# Patient Record
Sex: FEMALE | Race: WHITE | NOT HISPANIC OR LATINO | Employment: FULL TIME | URBAN - METROPOLITAN AREA
[De-identification: names, ages, dates, MRNs, and addresses within clinical notes are randomized per-mention and may not be internally consistent; named-entity substitution may affect disease eponyms.]

---

## 2020-11-19 ENCOUNTER — OFFICE VISIT (OUTPATIENT)
Dept: URGENT CARE | Age: 40
End: 2020-11-19
Payer: COMMERCIAL

## 2020-11-19 VITALS
WEIGHT: 188 LBS | BODY MASS INDEX: 33.31 KG/M2 | HEART RATE: 80 BPM | OXYGEN SATURATION: 100 % | RESPIRATION RATE: 18 BRPM | HEIGHT: 63 IN | TEMPERATURE: 98 F

## 2020-11-19 DIAGNOSIS — Z11.59 SPECIAL SCREENING EXAMINATION FOR UNSPECIFIED VIRAL DISEASE: Primary | ICD-10-CM

## 2020-11-19 PROCEDURE — U0003 INFECTIOUS AGENT DETECTION BY NUCLEIC ACID (DNA OR RNA); SEVERE ACUTE RESPIRATORY SYNDROME CORONAVIRUS 2 (SARS-COV-2) (CORONAVIRUS DISEASE [COVID-19]), AMPLIFIED PROBE TECHNIQUE, MAKING USE OF HIGH THROUGHPUT TECHNOLOGIES AS DESCRIBED BY CMS-2020-01-R: HCPCS | Performed by: NURSE PRACTITIONER

## 2020-11-19 PROCEDURE — 99213 OFFICE O/P EST LOW 20 MIN: CPT | Performed by: NURSE PRACTITIONER

## 2020-11-20 LAB — SARS-COV-2 RNA SPEC QL NAA+PROBE: DETECTED

## 2020-11-22 ENCOUNTER — TELEPHONE (OUTPATIENT)
Dept: DERMATOLOGY | Facility: CLINIC | Age: 40
End: 2020-11-22

## 2020-11-23 ENCOUNTER — TELEPHONE (OUTPATIENT)
Dept: URGENT CARE | Age: 40
End: 2020-11-23

## 2020-12-03 DIAGNOSIS — U07.1 COVID-19 VIRUS INFECTION: ICD-10-CM

## 2020-12-03 PROCEDURE — U0003 INFECTIOUS AGENT DETECTION BY NUCLEIC ACID (DNA OR RNA); SEVERE ACUTE RESPIRATORY SYNDROME CORONAVIRUS 2 (SARS-COV-2) (CORONAVIRUS DISEASE [COVID-19]), AMPLIFIED PROBE TECHNIQUE, MAKING USE OF HIGH THROUGHPUT TECHNOLOGIES AS DESCRIBED BY CMS-2020-01-R: HCPCS | Performed by: FAMILY MEDICINE

## 2020-12-04 LAB — SARS-COV-2 RNA SPEC QL NAA+PROBE: NOT DETECTED

## 2021-06-09 ENCOUNTER — LAB (OUTPATIENT)
Dept: LAB | Age: 41
End: 2021-06-09
Payer: COMMERCIAL

## 2021-06-09 DIAGNOSIS — N92.6 IRREGULAR MENSES: ICD-10-CM

## 2021-06-09 DIAGNOSIS — Z00.00 ROUTINE ADULT HEALTH MAINTENANCE: ICD-10-CM

## 2021-06-09 LAB
25(OH)D3 SERPL-MCNC: 12.9 NG/ML (ref 30–100)
ALBUMIN SERPL BCP-MCNC: 3.4 G/DL (ref 3.5–5)
ALP SERPL-CCNC: 55 U/L (ref 46–116)
ALT SERPL W P-5'-P-CCNC: 20 U/L (ref 12–78)
ANION GAP SERPL CALCULATED.3IONS-SCNC: 5 MMOL/L (ref 4–13)
AST SERPL W P-5'-P-CCNC: 11 U/L (ref 5–45)
BASOPHILS # BLD AUTO: 0.08 THOUSANDS/ΜL (ref 0–0.1)
BASOPHILS NFR BLD AUTO: 1 % (ref 0–1)
BILIRUB SERPL-MCNC: 0.33 MG/DL (ref 0.2–1)
BUN SERPL-MCNC: 16 MG/DL (ref 5–25)
CALCIUM ALBUM COR SERPL-MCNC: 9.3 MG/DL (ref 8.3–10.1)
CALCIUM SERPL-MCNC: 8.8 MG/DL (ref 8.3–10.1)
CHLORIDE SERPL-SCNC: 107 MMOL/L (ref 100–108)
CHOLEST SERPL-MCNC: 402 MG/DL (ref 50–200)
CO2 SERPL-SCNC: 26 MMOL/L (ref 21–32)
CREAT SERPL-MCNC: 0.61 MG/DL (ref 0.6–1.3)
EOSINOPHIL # BLD AUTO: 0.15 THOUSAND/ΜL (ref 0–0.61)
EOSINOPHIL NFR BLD AUTO: 1 % (ref 0–6)
ERYTHROCYTE [DISTWIDTH] IN BLOOD BY AUTOMATED COUNT: 13.3 % (ref 11.6–15.1)
FSH SERPL-ACNC: 7.7 MIU/ML
GFR SERPL CREATININE-BSD FRML MDRD: 111 ML/MIN/1.73SQ M
GLUCOSE P FAST SERPL-MCNC: 98 MG/DL (ref 65–99)
HCT VFR BLD AUTO: 43.3 % (ref 34.8–46.1)
HDLC SERPL-MCNC: 51 MG/DL
HGB BLD-MCNC: 14.3 G/DL (ref 11.5–15.4)
IMM GRANULOCYTES # BLD AUTO: 0.03 THOUSAND/UL (ref 0–0.2)
IMM GRANULOCYTES NFR BLD AUTO: 0 % (ref 0–2)
LDLC SERPL CALC-MCNC: 333 MG/DL (ref 0–100)
LH SERPL-ACNC: 27.7 MIU/ML
LYMPHOCYTES # BLD AUTO: 3.55 THOUSANDS/ΜL (ref 0.6–4.47)
LYMPHOCYTES NFR BLD AUTO: 32 % (ref 14–44)
MCH RBC QN AUTO: 30.3 PG (ref 26.8–34.3)
MCHC RBC AUTO-ENTMCNC: 33 G/DL (ref 31.4–37.4)
MCV RBC AUTO: 92 FL (ref 82–98)
MONOCYTES # BLD AUTO: 0.54 THOUSAND/ΜL (ref 0.17–1.22)
MONOCYTES NFR BLD AUTO: 5 % (ref 4–12)
NEUTROPHILS # BLD AUTO: 6.6 THOUSANDS/ΜL (ref 1.85–7.62)
NEUTS SEG NFR BLD AUTO: 61 % (ref 43–75)
NONHDLC SERPL-MCNC: 351 MG/DL
NRBC BLD AUTO-RTO: 0 /100 WBCS
PLATELET # BLD AUTO: 250 THOUSANDS/UL (ref 149–390)
PMV BLD AUTO: 11.4 FL (ref 8.9–12.7)
POTASSIUM SERPL-SCNC: 4.2 MMOL/L (ref 3.5–5.3)
PROLACTIN SERPL-MCNC: 9.6 NG/ML
PROT SERPL-MCNC: 7 G/DL (ref 6.4–8.2)
RBC # BLD AUTO: 4.72 MILLION/UL (ref 3.81–5.12)
SODIUM SERPL-SCNC: 138 MMOL/L (ref 136–145)
TRIGL SERPL-MCNC: 89 MG/DL
TSH SERPL DL<=0.05 MIU/L-ACNC: 1.22 UIU/ML (ref 0.36–3.74)
WBC # BLD AUTO: 10.95 THOUSAND/UL (ref 4.31–10.16)

## 2021-06-09 PROCEDURE — 84403 ASSAY OF TOTAL TESTOSTERONE: CPT

## 2021-06-09 PROCEDURE — 80061 LIPID PANEL: CPT

## 2021-06-09 PROCEDURE — 84402 ASSAY OF FREE TESTOSTERONE: CPT

## 2021-06-09 PROCEDURE — 80053 COMPREHEN METABOLIC PANEL: CPT

## 2021-06-09 PROCEDURE — 36415 COLL VENOUS BLD VENIPUNCTURE: CPT

## 2021-06-09 PROCEDURE — 84146 ASSAY OF PROLACTIN: CPT

## 2021-06-09 PROCEDURE — 85025 COMPLETE CBC W/AUTO DIFF WBC: CPT

## 2021-06-09 PROCEDURE — 84443 ASSAY THYROID STIM HORMONE: CPT

## 2021-06-09 PROCEDURE — 83001 ASSAY OF GONADOTROPIN (FSH): CPT

## 2021-06-09 PROCEDURE — 83002 ASSAY OF GONADOTROPIN (LH): CPT

## 2021-06-09 PROCEDURE — 82306 VITAMIN D 25 HYDROXY: CPT

## 2021-06-10 LAB
TESTOST FREE SERPL-MCNC: 3.9 PG/ML (ref 0–4.2)
TESTOST SERPL-MCNC: 47 NG/DL (ref 4–50)

## 2021-06-14 NOTE — RESULT ENCOUNTER NOTE
Please call the patient regarding her abnormal result    Cholesterol extremely high she must take medicine  vit d low, vit d 68853 unit once a week #12 and 2 ref

## 2021-11-01 ENCOUNTER — LAB (OUTPATIENT)
Dept: LAB | Age: 41
End: 2021-11-01
Payer: COMMERCIAL

## 2021-11-01 DIAGNOSIS — R42 DIZZINESS: ICD-10-CM

## 2021-11-01 DIAGNOSIS — M25.59 PAIN IN OTHER JOINT: ICD-10-CM

## 2021-11-01 DIAGNOSIS — D72.829 LEUKOCYTOSIS, UNSPECIFIED TYPE: ICD-10-CM

## 2021-11-01 DIAGNOSIS — E78.01 FAMILIAL HYPERCHOLESTEROLEMIA: ICD-10-CM

## 2021-11-01 LAB
ALBUMIN SERPL BCP-MCNC: 3.8 G/DL (ref 3.5–5)
ALP SERPL-CCNC: 63 U/L (ref 46–116)
ALT SERPL W P-5'-P-CCNC: 21 U/L (ref 12–78)
ANION GAP SERPL CALCULATED.3IONS-SCNC: 3 MMOL/L (ref 4–13)
AST SERPL W P-5'-P-CCNC: 13 U/L (ref 5–45)
BASOPHILS # BLD AUTO: 0.09 THOUSANDS/ΜL (ref 0–0.1)
BASOPHILS NFR BLD AUTO: 1 % (ref 0–1)
BILIRUB SERPL-MCNC: 0.36 MG/DL (ref 0.2–1)
BUN SERPL-MCNC: 19 MG/DL (ref 5–25)
CALCIUM SERPL-MCNC: 9.2 MG/DL (ref 8.3–10.1)
CHLORIDE SERPL-SCNC: 108 MMOL/L (ref 100–108)
CHOLEST SERPL-MCNC: 334 MG/DL (ref 50–200)
CK SERPL-CCNC: 85 U/L (ref 26–192)
CO2 SERPL-SCNC: 24 MMOL/L (ref 21–32)
CREAT SERPL-MCNC: 0.62 MG/DL (ref 0.6–1.3)
CRP SERPL QL: 6.9 MG/L
EOSINOPHIL # BLD AUTO: 0.21 THOUSAND/ΜL (ref 0–0.61)
EOSINOPHIL NFR BLD AUTO: 2 % (ref 0–6)
ERYTHROCYTE [DISTWIDTH] IN BLOOD BY AUTOMATED COUNT: 13.7 % (ref 11.6–15.1)
ERYTHROCYTE [SEDIMENTATION RATE] IN BLOOD: 37 MM/HOUR (ref 0–19)
GFR SERPL CREATININE-BSD FRML MDRD: 111 ML/MIN/1.73SQ M
GLUCOSE P FAST SERPL-MCNC: 102 MG/DL (ref 65–99)
HCT VFR BLD AUTO: 45 % (ref 34.8–46.1)
HDLC SERPL-MCNC: 67 MG/DL
HGB BLD-MCNC: 14.7 G/DL (ref 11.5–15.4)
IMM GRANULOCYTES # BLD AUTO: 0.07 THOUSAND/UL (ref 0–0.2)
IMM GRANULOCYTES NFR BLD AUTO: 1 % (ref 0–2)
LDLC SERPL CALC-MCNC: 254 MG/DL (ref 0–100)
LYMPHOCYTES # BLD AUTO: 3.08 THOUSANDS/ΜL (ref 0.6–4.47)
LYMPHOCYTES NFR BLD AUTO: 25 % (ref 14–44)
MCH RBC QN AUTO: 29.7 PG (ref 26.8–34.3)
MCHC RBC AUTO-ENTMCNC: 32.7 G/DL (ref 31.4–37.4)
MCV RBC AUTO: 91 FL (ref 82–98)
MONOCYTES # BLD AUTO: 0.58 THOUSAND/ΜL (ref 0.17–1.22)
MONOCYTES NFR BLD AUTO: 5 % (ref 4–12)
NEUTROPHILS # BLD AUTO: 8.45 THOUSANDS/ΜL (ref 1.85–7.62)
NEUTS SEG NFR BLD AUTO: 66 % (ref 43–75)
NONHDLC SERPL-MCNC: 267 MG/DL
NRBC BLD AUTO-RTO: 0 /100 WBCS
PLATELET # BLD AUTO: 239 THOUSANDS/UL (ref 149–390)
PMV BLD AUTO: 11.6 FL (ref 8.9–12.7)
POTASSIUM SERPL-SCNC: 4.2 MMOL/L (ref 3.5–5.3)
PROT SERPL-MCNC: 7.7 G/DL (ref 6.4–8.2)
RBC # BLD AUTO: 4.95 MILLION/UL (ref 3.81–5.12)
SODIUM SERPL-SCNC: 135 MMOL/L (ref 136–145)
TRIGL SERPL-MCNC: 63 MG/DL
URATE SERPL-MCNC: 3.5 MG/DL (ref 2–6.8)
WBC # BLD AUTO: 12.48 THOUSAND/UL (ref 4.31–10.16)

## 2021-11-01 PROCEDURE — 86038 ANTINUCLEAR ANTIBODIES: CPT

## 2021-11-01 PROCEDURE — 88185 FLOWCYTOMETRY/TC ADD-ON: CPT

## 2021-11-01 PROCEDURE — 84550 ASSAY OF BLOOD/URIC ACID: CPT

## 2021-11-01 PROCEDURE — 80061 LIPID PANEL: CPT

## 2021-11-01 PROCEDURE — 86430 RHEUMATOID FACTOR TEST QUAL: CPT

## 2021-11-01 PROCEDURE — 88184 FLOWCYTOMETRY/ TC 1 MARKER: CPT

## 2021-11-01 PROCEDURE — 82550 ASSAY OF CK (CPK): CPT

## 2021-11-01 PROCEDURE — 84165 PROTEIN E-PHORESIS SERUM: CPT | Performed by: PATHOLOGY

## 2021-11-01 PROCEDURE — 86618 LYME DISEASE ANTIBODY: CPT

## 2021-11-01 PROCEDURE — 85025 COMPLETE CBC W/AUTO DIFF WBC: CPT

## 2021-11-01 PROCEDURE — 84165 PROTEIN E-PHORESIS SERUM: CPT

## 2021-11-01 PROCEDURE — 85652 RBC SED RATE AUTOMATED: CPT

## 2021-11-01 PROCEDURE — 86140 C-REACTIVE PROTEIN: CPT

## 2021-11-01 PROCEDURE — 80053 COMPREHEN METABOLIC PANEL: CPT

## 2021-11-01 PROCEDURE — 36415 COLL VENOUS BLD VENIPUNCTURE: CPT

## 2021-11-02 LAB
ALBUMIN SERPL ELPH-MCNC: 4.51 G/DL (ref 3.5–5)
ALBUMIN SERPL ELPH-MCNC: 62.6 % (ref 52–65)
ALPHA1 GLOB SERPL ELPH-MCNC: 0.28 G/DL (ref 0.1–0.4)
ALPHA1 GLOB SERPL ELPH-MCNC: 3.9 % (ref 2.5–5)
ALPHA2 GLOB SERPL ELPH-MCNC: 0.84 G/DL (ref 0.4–1.2)
ALPHA2 GLOB SERPL ELPH-MCNC: 11.6 % (ref 7–13)
B BURGDOR IGG+IGM SER-ACNC: 35
BETA GLOB ABNORMAL SERPL ELPH-MCNC: 0.36 G/DL (ref 0.4–0.8)
BETA1 GLOB SERPL ELPH-MCNC: 5 % (ref 5–13)
BETA2 GLOB SERPL ELPH-MCNC: 4.4 % (ref 2–8)
BETA2+GAMMA GLOB SERPL ELPH-MCNC: 0.32 G/DL (ref 0.2–0.5)
GAMMA GLOB ABNORMAL SERPL ELPH-MCNC: 0.9 G/DL (ref 0.5–1.6)
GAMMA GLOB SERPL ELPH-MCNC: 12.5 % (ref 12–22)
IGG/ALB SER: 1.67 {RATIO} (ref 1.1–1.8)
PROT PATTERN SERPL ELPH-IMP: ABNORMAL
PROT SERPL-MCNC: 7.2 G/DL (ref 6.4–8.2)
RHEUMATOID FACT SER QL LA: NEGATIVE
SCAN RESULT: NORMAL

## 2021-11-03 LAB — RYE IGE QN: NEGATIVE

## 2021-11-08 ENCOUNTER — HOSPITAL ENCOUNTER (OUTPATIENT)
Dept: MRI IMAGING | Facility: HOSPITAL | Age: 41
Discharge: HOME | End: 2021-11-08
Payer: COMMERCIAL

## 2021-11-08 DIAGNOSIS — H53.8 BLURRED VISION: ICD-10-CM

## 2021-11-08 DIAGNOSIS — R42 DIZZINESS: ICD-10-CM

## 2021-11-08 DIAGNOSIS — R51.9 DAILY HEADACHE: ICD-10-CM

## 2021-11-08 PROCEDURE — G1004 CDSM NDSC: HCPCS

## 2021-11-08 PROCEDURE — 70551 MRI BRAIN STEM W/O DYE: CPT

## 2022-01-01 ENCOUNTER — OFFICE VISIT (OUTPATIENT)
Dept: URGENT CARE | Age: 42
End: 2022-01-01
Payer: COMMERCIAL

## 2022-01-01 VITALS
BODY MASS INDEX: 33.66 KG/M2 | HEART RATE: 88 BPM | WEIGHT: 190 LBS | RESPIRATION RATE: 18 BRPM | OXYGEN SATURATION: 98 % | HEIGHT: 63 IN | TEMPERATURE: 97 F

## 2022-01-01 DIAGNOSIS — J06.9 VIRAL URI WITH COUGH: Primary | ICD-10-CM

## 2022-01-01 LAB — S PYO AG THROAT QL: NEGATIVE

## 2022-01-01 PROCEDURE — 99213 OFFICE O/P EST LOW 20 MIN: CPT | Performed by: FAMILY MEDICINE

## 2022-01-01 PROCEDURE — 87880 STREP A ASSAY W/OPTIC: CPT

## 2022-01-01 PROCEDURE — 87636 SARSCOV2 & INF A&B AMP PRB: CPT | Performed by: FAMILY MEDICINE

## 2022-01-01 PROCEDURE — 87070 CULTURE OTHR SPECIMN AEROBIC: CPT | Performed by: FAMILY MEDICINE

## 2022-01-01 RX ORDER — BENZONATATE 200 MG/1
200 CAPSULE ORAL 3 TIMES DAILY PRN
Qty: 15 CAPSULE | Refills: 0 | Status: SHIPPED | OUTPATIENT
Start: 2022-01-01

## 2022-01-01 NOTE — PROGRESS NOTES
[facility]        NAME: Nereyda Aponte is a 35 y o  female  : 1980    MRN: [id number]  DATE: 2022  TIME: 1:15 PM    Assessment and Plan   Viral URI with cough [J06 9]  1  Viral URI with cough  COVID/FLU- Office Collect    Throat culture    POCT rapid strepA    benzonatate (TESSALON) 200 MG capsule         Patient Instructions     Strep negative  Will send for culture  Tessalon sent to pharmacy today for cough  COVID & Flu swab collected today, test results pending  COVID & Flu test results are available between 24 and 48 hours  Your results will come through [address]  Check MyChart and call if needed for test results  Quarantine guidelines discussed  OTC supplements/medications discussed  Follow-up with PCP in the next 1-2 days for re-evaluation if symptoms continue or worsen  Go to the ED if any fevers, unable to stay hydrated, abdominal pain, chest pain, shortness of breath, wheezing, chest tightness, cough or cold symptoms, new or worsening symptoms or other concerning symptoms  Chief Complaint     Chief Complaint   Patient presents with    Cough     fever, cough, sore throat, body aches x 3 days         History of Present Illness     Nereyda Aponte is a 35 y o  female presenting to the office today for upper respiratory complaints  Symptoms have been present for 2 days, and include cough, congestion and fever  She has tried OTC decongestants for her symptoms, with mild relief  Sick contacts include: none  Recent travel: none    Review of Systems     Review of Systems   Constitutional: Positive for chills, fatigue and fever  HENT: Positive for congestion, postnasal drip and sore throat  Negative for ear discharge, ear pain, sinus pressure and sinus pain  Eyes: Negative for pain and discharge  Respiratory: Positive for cough  Negative for shortness of breath  Cardiovascular: Negative for chest pain and palpitations     Gastrointestinal: Negative for abdominal pain, diarrhea, nausea and vomiting  Genitourinary: Negative for difficulty urinating and dysuria  Musculoskeletal: Negative for arthralgias and myalgias  Skin: Negative for rash  Neurological: Negative for dizziness, syncope, light-headedness, numbness and headaches  Psychiatric/Behavioral: Negative for agitation  All other systems reviewed and are negative  Current Medications       Current Outpatient Medications:     ergocalciferol (VITAMIN D2) 50,000 units, Take 1 capsule (50,000 Units total) by mouth once a week, Disp: 12 capsule, Rfl: 1    ezetimibe (ZETIA) 10 mg tablet, Take 1 tablet (10 mg total) by mouth daily, Disp: 90 tablet, Rfl: 3    levocetirizine (XYZAL) 5 MG tablet, Take 1 tablet (5 mg total) by mouth every evening, Disp: 90 tablet, Rfl: 1    phentermine (ADIPEX-P) 37 5 MG tablet, Take 1 tablet (37 5 mg total) by mouth daily, Disp: 90 tablet, Rfl: 0    Pitavastatin Calcium (Livalo) 4 MG TABS, Take 1 tablet (4 mg total) by mouth daily at bedtime, Disp: 90 tablet, Rfl: 3    benzonatate (TESSALON) 200 MG capsule, Take 1 capsule (200 mg total) by mouth 3 (three) times a day as needed for cough, Disp: 15 capsule, Rfl: 0    Current Allergies     Allergies as of 01/01/2022    (No Known Allergies)            The following portions of the patient's history were reviewed and updated as appropriate: allergies, current medications, past family history, past medical history, past social history, past surgical history and problem list      History reviewed  No pertinent past medical history  History reviewed  No pertinent surgical history  History reviewed  No pertinent family history  Medications have been verified  Objective     Pulse 88   Temp (!) 97 °F (36 1 °C)   Resp 18   Ht 5' 3" (1 6 m)   Wt 86 2 kg (190 lb)   SpO2 98%   BMI 33 66 kg/m²   No LMP recorded  Physical Exam     Physical Exam  Vitals reviewed     Constitutional:       General: She is not in acute distress  Appearance: Normal appearance  She is not ill-appearing  HENT:      Head: Normocephalic and atraumatic  Right Ear: Tympanic membrane and ear canal normal  No middle ear effusion  Left Ear: Tympanic membrane and ear canal normal   No middle ear effusion  Mouth/Throat:      Mouth: Mucous membranes are moist       Pharynx: Posterior oropharyngeal erythema present  No oropharyngeal exudate  Tonsils: No tonsillar exudate  Eyes:      Extraocular Movements: Extraocular movements intact  Conjunctiva/sclera: Conjunctivae normal       Pupils: Pupils are equal, round, and reactive to light  Cardiovascular:      Rate and Rhythm: Normal rate and regular rhythm  Pulses: Normal pulses  Heart sounds: Normal heart sounds  No murmur heard  Pulmonary:      Effort: Pulmonary effort is normal  No respiratory distress  Breath sounds: Normal breath sounds  No wheezing  Abdominal:      General: Bowel sounds are normal  There is no distension  Palpations: Abdomen is soft  Tenderness: There is no abdominal tenderness  There is no guarding  Musculoskeletal:      Cervical back: Normal range of motion and neck supple  No tenderness  Lymphadenopathy:      Cervical: Cervical adenopathy present  Skin:     General: Skin is warm  Neurological:      General: No focal deficit present  Mental Status: She is alert     Psychiatric:         Mood and Affect: Mood normal          Behavior: Behavior normal          Judgment: Judgment normal

## 2022-01-03 LAB — BACTERIA THROAT CULT: NORMAL

## 2022-01-06 LAB
FLUAV RNA RESP QL NAA+PROBE: NEGATIVE
FLUBV RNA RESP QL NAA+PROBE: NEGATIVE
SARS-COV-2 RNA RESP QL NAA+PROBE: POSITIVE

## 2022-07-02 ENCOUNTER — OFFICE VISIT (OUTPATIENT)
Dept: URGENT CARE | Age: 42
End: 2022-07-02
Payer: COMMERCIAL

## 2022-07-02 VITALS
TEMPERATURE: 98.7 F | HEART RATE: 75 BPM | BODY MASS INDEX: 35.44 KG/M2 | WEIGHT: 200 LBS | RESPIRATION RATE: 18 BRPM | OXYGEN SATURATION: 98 % | HEIGHT: 63 IN

## 2022-07-02 DIAGNOSIS — L23.7 POISON IVY: Primary | ICD-10-CM

## 2022-07-02 PROCEDURE — 99213 OFFICE O/P EST LOW 20 MIN: CPT

## 2022-07-02 RX ORDER — TRIAMCINOLONE ACETONIDE 1 MG/G
CREAM TOPICAL 2 TIMES DAILY
Qty: 30 G | Refills: 0 | Status: SHIPPED | OUTPATIENT
Start: 2022-07-02

## 2022-07-02 RX ORDER — PREDNISONE 20 MG/1
40 TABLET ORAL DAILY
Qty: 8 TABLET | Refills: 0 | Status: SHIPPED | OUTPATIENT
Start: 2022-07-02 | End: 2022-07-06

## 2022-07-02 NOTE — PROGRESS NOTES
[facility]        NAME: Marj Bautista is a 38 y o  female  : 1980    MRN: [id number]  DATE: 2022  TIME: 1:11 PM    Assessment and Plan   Poison ivy [L23 7]  1  Poison ivy  predniSONE 20 mg tablet    triamcinolone (KENALOG) 0 1 % cream     Patient presents with complaints of itchy rash with blisters  Started about a week ago after gardening  Rash started on legs and has spread to arms, stomach and hands  Assessment notes urticarial, vesicular rash to legs, hands, stomach  Will order prednisone and kenalog  Instructions provided regarding poison ivy  Patient Instructions       Follow up with PCP  As needed  Chief Complaint     Chief Complaint   Patient presents with    Rash     Rash x 1 week         History of Present Illness       Patient presents with complaints of itchy rash with blisters  Started about a week ago after gardening  Rash started on legs and has spread to arms, stomach and hands  Rash  Pertinent negatives include no congestion, cough, diarrhea, fever, shortness of breath, sore throat or vomiting  Review of Systems   Review of Systems   Constitutional: Negative for chills and fever  HENT: Negative for congestion, ear pain, postnasal drip, sinus pain and sore throat  Eyes: Negative for pain and itching  Respiratory: Negative for cough, shortness of breath and wheezing  Cardiovascular: Negative for chest pain and palpitations  Gastrointestinal: Negative for abdominal pain, constipation, diarrhea, nausea and vomiting  Genitourinary: Negative for difficulty urinating and hematuria  Musculoskeletal: Negative for arthralgias and myalgias  Skin: Positive for rash  Neurological: Negative for dizziness, light-headedness and headaches  Psychiatric/Behavioral: Negative for agitation and sleep disturbance  The patient is not nervous/anxious            Current Medications       Current Outpatient Medications:     ergocalciferol (VITAMIN D2) 50,000 units, Take 1 capsule (50,000 Units total) by mouth once a week, Disp: 12 capsule, Rfl: 1    ezetimibe (ZETIA) 10 mg tablet, Take 1 tablet (10 mg total) by mouth daily, Disp: 90 tablet, Rfl: 3    phentermine (ADIPEX-P) 37 5 MG tablet, Take 1 tablet (37 5 mg total) by mouth daily, Disp: 90 tablet, Rfl: 0    predniSONE 20 mg tablet, Take 2 tablets (40 mg total) by mouth daily for 4 days, Disp: 8 tablet, Rfl: 0    topiramate (TOPAMAX) 25 mg sprinkle capsule, Take 1 capsule (25 mg total) by mouth 2 (two) times a day, Disp: 60 capsule, Rfl: 3    triamcinolone (KENALOG) 0 1 % cream, Apply topically 2 (two) times a day, Disp: 30 g, Rfl: 0    benzonatate (TESSALON) 200 MG capsule, Take 1 capsule (200 mg total) by mouth 3 (three) times a day as needed for cough, Disp: 15 capsule, Rfl: 0    levocetirizine (XYZAL) 5 MG tablet, Take 1 tablet (5 mg total) by mouth every evening (Patient not taking: Reported on 7/2/2022), Disp: 90 tablet, Rfl: 1    Current Allergies     Allergies as of 07/02/2022    (No Known Allergies)            The following portions of the patient's history were reviewed and updated as appropriate: allergies, current medications, past family history, past medical history, past social history, past surgical history and problem list      History reviewed  No pertinent past medical history  History reviewed  No pertinent surgical history  History reviewed  No pertinent family history  Medications have been verified  Objective   Pulse 75   Temp 98 7 °F (37 1 °C)   Resp 18   Ht 5' 3" (1 6 m)   Wt 90 7 kg (200 lb)   SpO2 98%   BMI 35 43 kg/m²   No LMP recorded  Physical Exam     Physical Exam  Vitals reviewed  Constitutional:       General: She is not in acute distress  Appearance: Normal appearance  She is not ill-appearing  HENT:      Head: Normocephalic and atraumatic  Eyes:      Extraocular Movements: Extraocular movements intact        Conjunctiva/sclera: Conjunctivae normal    Pulmonary:      Effort: Pulmonary effort is normal    Skin:     General: Skin is warm  Findings: Rash present  Rash is urticarial and vesicular  Neurological:      General: No focal deficit present  Mental Status: She is alert     Psychiatric:         Mood and Affect: Mood normal          Behavior: Behavior normal          Judgment: Judgment normal

## 2022-07-05 DIAGNOSIS — L23.7 POISON IVY: ICD-10-CM

## 2022-07-05 RX ORDER — TRIAMCINOLONE ACETONIDE 1 MG/G
CREAM TOPICAL 2 TIMES DAILY
Qty: 30 G | Refills: 0 | Status: CANCELLED | OUTPATIENT
Start: 2022-07-05

## 2022-07-05 RX ORDER — PREDNISONE 20 MG/1
40 TABLET ORAL DAILY
Qty: 8 TABLET | Refills: 0 | Status: CANCELLED | OUTPATIENT
Start: 2022-07-05 | End: 2022-07-09

## 2025-01-05 ENCOUNTER — OFFICE VISIT (OUTPATIENT)
Dept: URGENT CARE | Age: 45
End: 2025-01-05
Payer: COMMERCIAL

## 2025-01-05 VITALS
RESPIRATION RATE: 18 BRPM | HEART RATE: 75 BPM | OXYGEN SATURATION: 98 % | BODY MASS INDEX: 40.67 KG/M2 | HEIGHT: 62 IN | DIASTOLIC BLOOD PRESSURE: 60 MMHG | WEIGHT: 221 LBS | TEMPERATURE: 96.5 F | SYSTOLIC BLOOD PRESSURE: 110 MMHG

## 2025-01-05 DIAGNOSIS — K08.89 PAIN, DENTAL: Primary | ICD-10-CM

## 2025-01-05 PROCEDURE — G0382 LEV 3 HOSP TYPE B ED VISIT: HCPCS

## 2025-01-05 PROCEDURE — S9083 URGENT CARE CENTER GLOBAL: HCPCS

## 2025-01-05 RX ORDER — CHLORHEXIDINE GLUCONATE ORAL RINSE 1.2 MG/ML
15 SOLUTION DENTAL 2 TIMES DAILY
Qty: 120 ML | Refills: 0 | Status: SHIPPED | OUTPATIENT
Start: 2025-01-05

## 2025-01-05 RX ORDER — AMOXICILLIN 500 MG/1
500 CAPSULE ORAL EVERY 8 HOURS SCHEDULED
Qty: 21 CAPSULE | Refills: 0 | Status: SHIPPED | OUTPATIENT
Start: 2025-01-05 | End: 2025-01-12

## 2025-01-05 NOTE — PROGRESS NOTES
[facility]        NAME: Elissa Barraza is a 44 y.o. female  : 1980    MRN: [id number]  DATE: 2025  TIME: 3:08 PM    Assessment and Plan   Pain, dental [K08.89]  1. Pain, dental  amoxicillin (AMOXIL) 500 mg capsule    chlorhexidine (PERIDEX) 0.12 % solution        Patient declined work excuse note at this time.    Patient Instructions     Start antibiotic as prescribed  Start mouthwash as prescribed  Follow up with dentist  Salt water gargles  Ice over area  Ibuprofen 600-800mg + Tylenol 1000mg every 6 hours provided you do not have a history of kidney or liver dysfunction. Do not exceed this amount.  Follow up with PCP in 3-5 days.  Proceed to ER if symptoms worsen.     Eat yogurt with live and active cultures and/or take a probiotic at least 3 hours before or after antibiotic dose. Monitor stool for diarrhea and/or blood. If this occurs, contact primary care doctor ASAP.    If tests are performed, our office will contact you with results only if changes need to made to the care plan discussed with you at the visit. [facility]    Chief Complaint     Chief Complaint   Patient presents with    Dental Injury     Patient reports broken tooth on the top right side of her face that is causing pain radiating to entire right side of face. Reports causing headaches. Reports having chills. Denies measuring fever. Reports taking Motrin/Tylenol and Advil with no improvement. Reports having previous cavity on tooth and was supposed to follow up with dental work but didn't. Reports part of tooth broke off while she was eating. Reports severe pain 10/10.          History of Present Illness       Patient is a 43 yo female with no significant PMH presenting in the clinic today for dental pain x 2 days. Admits chills, right sided headache, right-sided facial swelling, right-sided upper dental pain. Denies fever, dizziness, drooling, trismus, sore throat,  difficulty swallowing, difficulty breathing, ear pain, neck pain, chest pain, SOB. Admits the use of Tylenol and ibuprofen for symptom management.  Patient states she has not been seen by a dentist in approximately 1 year.        Review of Systems   Review of Systems   Constitutional:  Positive for fever. Negative for chills.   HENT:  Positive for dental problem and facial swelling. Negative for drooling, ear pain, sore throat, trouble swallowing and voice change.    Respiratory:  Negative for shortness of breath.    Cardiovascular:  Negative for chest pain.   Musculoskeletal:  Negative for neck pain.   Neurological:  Positive for headaches. Negative for dizziness.         Current Medications       Current Outpatient Medications:     amoxicillin (AMOXIL) 500 mg capsule, Take 1 capsule (500 mg total) by mouth every 8 (eight) hours for 7 days, Disp: 21 capsule, Rfl: 0    chlorhexidine (PERIDEX) 0.12 % solution, Apply 15 mL to the mouth or throat 2 (two) times a day, Disp: 120 mL, Rfl: 0    ezetimibe (ZETIA) 10 mg tablet, TAKE ONE TABLET BY MOUTH EVERY DAY, Disp: 90 tablet, Rfl: 3    phentermine (ADIPEX-P) 37.5 MG tablet, TAKE ONE TABLET BY MOUTH EVERY DAY, Disp: 90 tablet, Rfl: 0    benzonatate (TESSALON) 200 MG capsule, Take 1 capsule (200 mg total) by mouth 3 (three) times a day as needed for cough (Patient not taking: Reported on 1/5/2025), Disp: 15 capsule, Rfl: 0    ergocalciferol (VITAMIN D2) 50,000 units, TAKE ONE CAPSULE ONCE A WEEK (Patient not taking: Reported on 1/5/2025), Disp: 12 capsule, Rfl: 1    levocetirizine (XYZAL) 5 MG tablet, Take 1 tablet (5 mg total) by mouth every evening (Patient not taking: Reported on 1/5/2025), Disp: 90 tablet, Rfl: 1    topiramate (TOPAMAX) 25 mg sprinkle capsule, Take 1 capsule (25 mg total) by mouth 2 (two) times a day (Patient not taking: Reported on 1/5/2025), Disp: 60 capsule, Rfl: 3    triamcinolone (KENALOG) 0.1 % cream, Apply topically 2 (two) times a day (Patient  "not taking: Reported on 1/5/2025), Disp: 30 g, Rfl: 0    Current Allergies     Allergies as of 01/05/2025    (No Known Allergies)            The following portions of the patient's history were reviewed and updated as appropriate: allergies, current medications, past family history, past medical history, past social history, past surgical history and problem list.     No past medical history on file.    No past surgical history on file.    No family history on file.      Medications have been verified.        Objective   /60   Pulse 75   Temp (!) 96.5 °F (35.8 °C) (Tympanic)   Resp 18   Ht 5' 2\" (1.575 m)   Wt 100 kg (221 lb)   SpO2 98%   BMI 40.42 kg/m²        Physical Exam     Physical Exam  Vitals reviewed.   Constitutional:       General: She is not in acute distress.     Appearance: Normal appearance. She is normal weight. She is not ill-appearing.      Comments: Patient sitting comfortably on exam table throughout encounter.  No drooling or tripoding noted.  Patient tolerating her own secretions.  NAD.   HENT:      Head: Normocephalic.      Jaw: No trismus.      Nose: Nose normal.      Mouth/Throat:      Lips: Pink.      Mouth: Mucous membranes are moist.      Dentition: Abnormal dentition. Dental tenderness and dental caries present. No gingival swelling or dental abscesses.      Pharynx: Oropharynx is clear. Uvula midline. No pharyngeal swelling, oropharyngeal exudate, posterior oropharyngeal erythema or uvula swelling.     Eyes:      Conjunctiva/sclera: Conjunctivae normal.   Cardiovascular:      Rate and Rhythm: Normal rate and regular rhythm.      Pulses: Normal pulses.      Heart sounds: Normal heart sounds. No murmur heard.     No friction rub. No gallop.   Pulmonary:      Effort: Pulmonary effort is normal.      Breath sounds: Normal breath sounds. No wheezing, rhonchi or rales.   Musculoskeletal:      Cervical back: Normal range of motion and neck supple. No tenderness.   Lymphadenopathy: "      Cervical: No cervical adenopathy.   Skin:     General: Skin is warm.      Findings: No rash.   Neurological:      Mental Status: She is alert.   Psychiatric:         Mood and Affect: Mood normal.         Behavior: Behavior normal.

## 2025-01-05 NOTE — PATIENT INSTRUCTIONS
Start antibiotic as prescribed  Start mouthwash as prescribed  Follow up with dentist  Salt water gargles  Ice over area  Ibuprofen 600-800mg + Tylenol 1000mg every 6 hours provided you do not have a history of kidney or liver dysfunction. Do not exceed this amount.  Follow up with PCP in 3-5 days.  Proceed to ER if symptoms worsen.     Eat yogurt with live and active cultures and/or take a probiotic at least 3 hours before or after antibiotic dose. Monitor stool for diarrhea and/or blood. If this occurs, contact primary care doctor ASAP.

## 2025-04-14 ENCOUNTER — EVALUATION (OUTPATIENT)
Dept: PHYSICAL THERAPY | Facility: REHABILITATION | Age: 45
End: 2025-04-14
Payer: COMMERCIAL

## 2025-04-14 DIAGNOSIS — S06.0X0A CONCUSSION WITHOUT LOSS OF CONSCIOUSNESS, INITIAL ENCOUNTER: ICD-10-CM

## 2025-04-14 PROCEDURE — 97163 PT EVAL HIGH COMPLEX 45 MIN: CPT | Performed by: PHYSICAL THERAPIST
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
## 2025-04-16 ENCOUNTER — EVALUATION (OUTPATIENT)
Dept: PHYSICAL THERAPY | Facility: REHABILITATION | Age: 45
End: 2025-04-16
Attending: FAMILY MEDICINE
Payer: COMMERCIAL

## 2025-04-16 DIAGNOSIS — S06.0X0A CONCUSSION WITHOUT LOSS OF CONSCIOUSNESS, INITIAL ENCOUNTER: Primary | ICD-10-CM

## 2025-04-16 DIAGNOSIS — M25.562 ACUTE PAIN OF LEFT KNEE: ICD-10-CM

## 2025-04-16 PROCEDURE — 97110 THERAPEUTIC EXERCISES: CPT | Performed by: PHYSICAL THERAPIST

## 2025-04-16 PROCEDURE — 97164 PT RE-EVAL EST PLAN CARE: CPT | Performed by: PHYSICAL THERAPIST

## 2025-04-16 PROCEDURE — 97140 MANUAL THERAPY 1/> REGIONS: CPT | Performed by: PHYSICAL THERAPIST
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
## 2025-04-17 ENCOUNTER — TELEPHONE (OUTPATIENT)
Age: 45
End: 2025-04-17

## 2025-04-17 NOTE — TELEPHONE ENCOUNTER
LVM for pt based on a referral we received for pt to be seen in our office. Informed pt that we are looking for a little more info on what they are coming in for. Advised pt to callback with this information so we can properly schedule.

## 2025-04-23 ENCOUNTER — OFFICE VISIT (OUTPATIENT)
Dept: PHYSICAL THERAPY | Facility: REHABILITATION | Age: 45
End: 2025-04-23
Attending: FAMILY MEDICINE
Payer: COMMERCIAL

## 2025-04-23 DIAGNOSIS — M25.562 ACUTE PAIN OF LEFT KNEE: ICD-10-CM

## 2025-04-23 DIAGNOSIS — S06.0X0A CONCUSSION WITHOUT LOSS OF CONSCIOUSNESS, INITIAL ENCOUNTER: Primary | ICD-10-CM

## 2025-04-23 PROCEDURE — 97110 THERAPEUTIC EXERCISES: CPT

## 2025-04-23 PROCEDURE — 97112 NEUROMUSCULAR REEDUCATION: CPT

## 2025-04-23 PROCEDURE — 97140 MANUAL THERAPY 1/> REGIONS: CPT

## 2025-04-23 NOTE — PROGRESS NOTES
"Daily Note     Today's date: 2025  Patient name: Elissa Barraza  : 1980  MRN: [id number]  Referring provider: Ivan Perez MD  Dx:   Encounter Diagnosis     ICD-10-CM    1. Concussion without loss of consciousness, initial encounter  S06.0X0A       2. Acute pain of left knee  M25.562                      Subjective: Patient states the more activity or walking she does her knee will hurt more and [place] and it took her a few minutes to remember why she was there and what she wanted to say.       Objective: See treatment diary below      Assessment: Tolerated treatment fair. Good relief from passive stretches/ SOR. L hand gets fatigued quickly as per patient, and slight headache post treatment as well. Lightheaded if supine for too long.  Patient would benefit from continued PT      Plan: Continue per plan of care.        Precautions: There is no problem list on file for this patient.        Daily Treatment Diary     Assessment           Visit number             Eval/Reval x knee           FOTO missed x   **     **   HEP Issued  x            Manuals             SOR NT NT GF- gentle / supine          STM/MFR  NT brief gentle GF B UT                                    Exercise Diary                           Cerv ret 3\"x5 3\"x10 3\" x 10           Cerv flex 3\"x5 3\"x10 3\" x 10           SNAGs w/left rot 3\"x5 3\"x10 3\" x 10           Pencil push ups 2x5 1x10 1 x 10           Smooth pursuit             saccades             UT  stretch   30 \" x 3 B                                                 SAQ   2 x 10  B          QS w/towel  5\"x10 5\" 2 x 10 L          Prone press ups  1x10 1 x 10           Heel slides   1x10 5\" 2 x 10                        Modalities             MH 10'  C-spine x 10 min sit                              "

## 2025-04-25 ENCOUNTER — CONSULT (OUTPATIENT)
Dept: PLASTIC SURGERY | Facility: CLINIC | Age: 45
End: 2025-04-25
Payer: COMMERCIAL

## 2025-04-25 VITALS
SYSTOLIC BLOOD PRESSURE: 141 MMHG | DIASTOLIC BLOOD PRESSURE: 106 MMHG | HEIGHT: 62 IN | BODY MASS INDEX: 40.12 KG/M2 | HEART RATE: 93 BPM | TEMPERATURE: 96 F | WEIGHT: 218 LBS

## 2025-04-25 DIAGNOSIS — E75.5 CHOLESTEROL DEPOSITION IN SKIN: ICD-10-CM

## 2025-04-25 DIAGNOSIS — H02.834 DERMATOCHALASIS OF BOTH UPPER EYELIDS: Primary | ICD-10-CM

## 2025-04-25 DIAGNOSIS — H02.831 DERMATOCHALASIS OF BOTH UPPER EYELIDS: Primary | ICD-10-CM

## 2025-04-25 PROCEDURE — 99202 OFFICE O/P NEW SF 15 MIN: CPT

## 2025-04-28 ENCOUNTER — OFFICE VISIT (OUTPATIENT)
Dept: PHYSICAL THERAPY | Facility: REHABILITATION | Age: 45
End: 2025-04-28
Attending: FAMILY MEDICINE
Payer: COMMERCIAL

## 2025-04-28 DIAGNOSIS — M25.562 ACUTE PAIN OF LEFT KNEE: ICD-10-CM

## 2025-04-28 DIAGNOSIS — S06.0X0A CONCUSSION WITHOUT LOSS OF CONSCIOUSNESS, INITIAL ENCOUNTER: Primary | ICD-10-CM

## 2025-04-28 PROCEDURE — 97112 NEUROMUSCULAR REEDUCATION: CPT | Performed by: PHYSICAL THERAPIST

## 2025-04-28 PROCEDURE — 97140 MANUAL THERAPY 1/> REGIONS: CPT | Performed by: PHYSICAL THERAPIST

## 2025-04-28 PROCEDURE — 97110 THERAPEUTIC EXERCISES: CPT | Performed by: PHYSICAL THERAPIST

## 2025-04-28 NOTE — PROGRESS NOTES
"Daily Note     Today's date: 2025  Patient name: Elissa Barraza  : 1980  MRN: [id number]  Referring provider: Ivan Perez MD  Dx:   Encounter Diagnosis     ICD-10-CM    1. Concussion without loss of consciousness, initial encounter  S06.0X0A       2. Acute pain of left knee  M25.562                        Subjective: Patient reports continued clicking in the knee and difficulty with concentration and memory.  She notes that her headaches are getting less and her neck pain comes and goes.         Objective: See treatment diary below      Assessment: Tolerated treatment fair. Convergence has not significantly improved.  Vertical saccades were mildly symptomatic so these were added to HEP.  Cervical ROM appears to be improving.  Significant mm spasm persists.  She would benefit from OT to address her cognitive impairments.   Patient would benefit from continued PT      Plan: Continue per plan of care.        Precautions: There is no problem list on file for this patient.        Daily Treatment Diary     Assessment          Visit number             Eval/Reval x knee           FOTO missed x   **     **   HEP Issued  x            Manuals             SOR NT NT GF- gentle / supine NT         STM/MFR  NT brief gentle GF B UT NT         McConnel taping for med. glide    NT                      Exercise Diary                           Cerv ret 3\"x5 3\"x10 3\" x 10  3\"x10         Cerv flex 3\"x5 3\"x10 3\" x 10  3\"x10         SNAGs w/left rot 3\"x5 3\"x10 3\" x 10  3\"x10         Pencil push ups 2x5 1x10 1 x 10  1x10         Smooth pursuit             saccades    30\" ea         UT  stretch   30 \" x 3 B                                                 SAQ   2 x 10  B 2x10         QS w/towel  5\"x10 5\" 2 x 10 L Seated on chair 5\"x10         Prone press ups  1x10 1 x 10  1x10         Heel slides   1x10 5\" 2 x 10  1x10         Bike warm up    L0x5'         Modalities             MH 10'  C-spine x 10 min sit " She did not have time

## 2025-04-30 ENCOUNTER — OFFICE VISIT (OUTPATIENT)
Dept: PHYSICAL THERAPY | Facility: REHABILITATION | Age: 45
End: 2025-04-30
Attending: FAMILY MEDICINE
Payer: COMMERCIAL

## 2025-04-30 DIAGNOSIS — M25.562 ACUTE PAIN OF LEFT KNEE: ICD-10-CM

## 2025-04-30 DIAGNOSIS — S06.0X0A CONCUSSION WITHOUT LOSS OF CONSCIOUSNESS, INITIAL ENCOUNTER: Primary | ICD-10-CM

## 2025-04-30 PROCEDURE — 97112 NEUROMUSCULAR REEDUCATION: CPT

## 2025-04-30 PROCEDURE — 97110 THERAPEUTIC EXERCISES: CPT

## 2025-04-30 PROCEDURE — 97140 MANUAL THERAPY 1/> REGIONS: CPT
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
## 2025-05-05 ENCOUNTER — OFFICE VISIT (OUTPATIENT)
Dept: PHYSICAL THERAPY | Facility: REHABILITATION | Age: 45
End: 2025-05-05
Attending: FAMILY MEDICINE
Payer: COMMERCIAL

## 2025-05-05 DIAGNOSIS — S06.0X0A CONCUSSION WITHOUT LOSS OF CONSCIOUSNESS, INITIAL ENCOUNTER: Primary | ICD-10-CM

## 2025-05-05 DIAGNOSIS — M25.562 ACUTE PAIN OF LEFT KNEE: ICD-10-CM

## 2025-05-05 PROCEDURE — 97112 NEUROMUSCULAR REEDUCATION: CPT | Performed by: PHYSICAL THERAPIST

## 2025-05-05 PROCEDURE — 97110 THERAPEUTIC EXERCISES: CPT | Performed by: PHYSICAL THERAPIST

## 2025-05-05 PROCEDURE — 97140 MANUAL THERAPY 1/> REGIONS: CPT | Performed by: PHYSICAL THERAPIST

## 2025-05-05 NOTE — PROGRESS NOTES
"Daily Note     Today's date: 2025  Patient name: Elissa Barraza  : 1980  MRN: [id number]  Referring provider: Ivan Perez MD  Dx:   Encounter Diagnosis     ICD-10-CM    1. Concussion without loss of consciousness, initial encounter  S06.0X0A       2. Acute pain of left knee  M25.562                          Subjective: Patient reports continued tightness in her neck which increases with longer drives.  McConnel taping at the knee continues to be helpful.  Noted during exercises that her left arm fatigues quickly    Objective: See treatment diary below  (+) ULTT median    Assessment: pt with fair tolerance to treatment. MWM with quad sets significantly decreased pain.  VMO is still not firing well.  Progressed exercises to improve this.  Pt reported a tired feeling in the left UE and does have UL tension.  Added nerve glides to address this.   Pt would benefit from continued skilled PT to improve current deficits and maximize function.    Plan: Continue per plan of care.        Precautions: There is no problem list on file for this patient.        Daily Treatment Diary     Assessment        Visit number             Eval/Reval x knee           FOTO missed x   **     **   HEP Issued  x            Manuals             SOR NT NT GF- gentle / supine NT SE NP       STM/MFR  NT brief gentle GF B UT NT SE MFD B 2' ea       McConnel taping for med. glide    NT SE NT                    Exercise Diary                           Cerv ret 3\"x5 3\"x10 3\" x 10  3\"x10 3\"x10 3\"x10       Cerv flex 3\"x5 3\"x10 3\" x 10  3\"x10 3\"x10 3\"x10       SNAGs w/left rot 3\"x5 3\"x10 3\" x 10  3\"x10 3\"x10 3\"x10       Pencil push ups 2x5 1x10 1 x 10  1x10 1x10 NP       Smooth pursuit      1x10       saccades    30\" ea 30\" ea        UT  stretch   30 \" x 3 B   R SB during L cupping        Median Nerve glide      1x10                                 SAQ   2 x 10  B 2x10 2x10 W/ball sq.  5\" 2x10       QS w/towel  " "5\"x10 5\" 2 x 10 L Seated on chair 5\"x10 5\"x10 W/ lateral glide MWM 2x10       Prone press ups  1x10 1 x 10  1x10  1x10       Heel slides   1x10 5\" 2 x 10  1x10 @home today        Leg press w/ball squeeze      22# 1x10       Bike warm up    L0x5' L0x5' L0x5'       Modalities             MH 10'  C-spine x 10 min sit She did not have time 5 min with bike, 5 min seated 5 min on bike 5 min during QS                           "

## 2025-05-07 ENCOUNTER — OFFICE VISIT (OUTPATIENT)
Dept: PHYSICAL THERAPY | Facility: REHABILITATION | Age: 45
End: 2025-05-07
Attending: FAMILY MEDICINE
Payer: COMMERCIAL

## 2025-05-07 DIAGNOSIS — S06.0X0A CONCUSSION WITHOUT LOSS OF CONSCIOUSNESS, INITIAL ENCOUNTER: Primary | ICD-10-CM

## 2025-05-07 DIAGNOSIS — M25.562 ACUTE PAIN OF LEFT KNEE: ICD-10-CM

## 2025-05-07 PROCEDURE — 97110 THERAPEUTIC EXERCISES: CPT

## 2025-05-07 PROCEDURE — 97112 NEUROMUSCULAR REEDUCATION: CPT

## 2025-05-07 NOTE — PROGRESS NOTES
[facility]astic [facility]  [address], [address], [place] [address]  [phone]    Patient Identification: Elissa Barraza is a 45 y.o. female     History of Present Illness: The patient is a 45 y.o.  year-old female  who presents to the office for cholesterol deposits of the upper eyelids.  Patient states she has multiple cholesterol deposits to her upper eyelids. She is interested in discussing options of removal as they have grown larger in size over the past couple years. She is also interested in discussing options of upper and lower blepharoplasties, as she is unhappy with her appearance.   Patient has no other complaints at this time.    History reviewed. No pertinent past medical history.       Review of Systems  Constitutional: Denies fevers, chills or pain.  Skin: Denies any warmth, erythema, edema or mucopurulent drainage. Xanthelasma     Physical Exam  General: AAOx3, pleasant, no distress.  Eyelids: 2 upper eyelid xanthomas to the right eye lid. Xanthoma to the left medial canthus. Bilateral upper eyelids with excess skin. Mild dermatochalasis to bilateral lower eyelids. See media    Assessment and Plan:  The patient is an 45 y.o.  year-old female who presents to the office for a new patient consultation regarding xanthelasma to the upper eyelids.      -At today's visit chart reviewed, history obtained and exam conducted. Xanthelasma identified to bilateral upper eyelids. Patent is also interested in discussing options for upper and lower blepharoplasties. We discussed the patient is to meet with a surgeon to go over her surgical options and timing of xanthelasma excision VS upper/lower blephs. Discussed the need for field of vision testing if submitting through insurance for upper belphs. Patient understands and agrees with plan.  -The patient is to return to discuss consultation for upper/lower blephs as well as xanthelasma excision.   -The patient is to call the  office with any questions or concerns. All of the patient's questions were answered at this time and they agree with the plan of care.    I have spent a total time of 20 minutes in caring for this patient on the day of the visit/encounter including Impressions, Counseling / Coordination of care, Documenting in the medical record, and Obtaining or reviewing history  .   Yany Barraza PA-C  [facility] Plastic and Reconstructive Surgery

## 2025-05-07 NOTE — PROGRESS NOTES
"Daily Note     Today's date: 2025  Patient name: Elissa Barraza  : 1980  MRN: [id number]  Referring provider: Ivan Perez MD  Dx:   Encounter Diagnosis     ICD-10-CM    1. Concussion without loss of consciousness, initial encounter  S06.0X0A       2. Acute pain of left knee  M25.562                          Subjective: Patient reports her neck \"locks up\" after PT until the next morning. She states she feels about the same today.    Objective: See treatment diary below      Assessment: pt with fair tolerance to treatment. Continued with MFD to B/L UT with good tolerance. Cues to perform cervical retraction in pain free range. Smooth pursuits challenging, pt reports \"burning\" in her L eye with it watering. Continues to respond favorably to McConnel taping. L UT fatigue continues, pt is consistent with nerve glides at home. Will continue to progress as able. Pt would benefit from continued skilled PT to improve current deficits and maximize function.    Plan: Continue per plan of care.        Precautions: There is no problem list on file for this patient.        Daily Treatment Diary     Assessment       Visit number             Eval/Reval x knee           FOTO missed x   **     **   HEP Issued  x            Manuals             SOR NT NT GF- gentle / supine NT SE NP       STM/MFR  NT brief gentle GF B UT NT SE MFD B 2' ea MFD B  2' ea      McConnel taping for med. glide    NT SE NT SE                   Exercise Diary                           Cerv ret 3\"x5 3\"x10 3\" x 10  3\"x10 3\"x10 3\"x10 3\"x10      Cerv flex 3\"x5 3\"x10 3\" x 10  3\"x10 3\"x10 3\"x10 3\"x10      SNAGs w/left rot 3\"x5 3\"x10 3\" x 10  3\"x10 3\"x10 3\"x10 3\"x10      Pencil push ups 2x5 1x10 1 x 10  1x10 1x10 NP       Smooth pursuit      1x10 1x10      saccades    30\" ea 30\" ea        UT  stretch   30 \" x 3 B   R SB during L cupping R SB during L cupping       Median Nerve glide      1x10 1x10                           " "     SAQ   2 x 10  B 2x10 2x10 W/ball sq.  5\" 2x10 W/ball  5\"  2x10      QS w/towel  5\"x10 5\" 2 x 10 L Seated on chair 5\"x10 5\"x10 W/ lateral glide MWM 2x10 W/lateral glide MWM 2x10      Prone press ups  1x10 1 x 10  1x10  1x10 1x10      Heel slides   1x10 5\" 2 x 10  1x10 @home today        Leg press w/ball squeeze      22# 1x10 22#  1x10      Bike warm up    L0x5' L0x5' L0x5' L1x5'      Modalities             MH 10'  C-spine x 10 min sit She did not have time 5 min with bike, 5 min seated 5 min on bike 5 min during QS 5 min w/bike                          "

## 2025-05-12 ENCOUNTER — OFFICE VISIT (OUTPATIENT)
Dept: PHYSICAL THERAPY | Facility: REHABILITATION | Age: 45
End: 2025-05-12
Attending: FAMILY MEDICINE
Payer: COMMERCIAL

## 2025-05-12 DIAGNOSIS — S06.0X0A CONCUSSION WITHOUT LOSS OF CONSCIOUSNESS, INITIAL ENCOUNTER: Primary | ICD-10-CM

## 2025-05-12 DIAGNOSIS — M25.562 ACUTE PAIN OF LEFT KNEE: ICD-10-CM

## 2025-05-12 PROCEDURE — 97110 THERAPEUTIC EXERCISES: CPT | Performed by: PHYSICAL THERAPIST

## 2025-05-12 PROCEDURE — 97140 MANUAL THERAPY 1/> REGIONS: CPT | Performed by: PHYSICAL THERAPIST

## 2025-05-12 NOTE — PROGRESS NOTES
"Daily Note     Today's date: 2025  Patient name: Elissa Barraza  : 1980  MRN: [id number]  Referring provider: Ivan Perez MD  Dx:   Encounter Diagnosis     ICD-10-CM    1. Concussion without loss of consciousness, initial encounter  S06.0X0A       2. Acute pain of left knee  M25.562                            Subjective: Patient reports that her neck did not lock up after therapy last visit.  Her low back is feeling much better.  Her knee is still quite painful.      Objective: See treatment diary below  Acutely tender over the lateral joint line of the left knee.      Assessment: Significant clicking with bike and leg press.  She may have a meniscus tear and she stated that she has an orthopedic MD she likes.  She was advised to make an appointment with him.  Her left eye continues to deviate with convergence and cause headaches.  She has an OT appointment this week so we will put eye exercises on hold for now.   Will continue to progress as able. Pt would benefit from continued skilled PT to improve current deficits and maximize function.    Plan: Continue per plan of care.        Precautions: There is no problem list on file for this patient.        Daily Treatment Diary     Assessment      Visit number             Eval/Reval x knee           FOTO missed x   **     **   HEP Issued  x            Manuals             SOR NT NT GF- gentle / supine NT SE NP       STM/MFR  NT brief gentle GF B UT NT SE MFD B 2' ea MFD B  2' ea MFD B 2' ea     Natalya taping for med. glide    NT SE NT SE NT                  Exercise Diary                           Cerv ret 3\"x5 3\"x10 3\" x 10  3\"x10 3\"x10 3\"x10 3\"x10 W/op 3\" x10     Cerv ext             Cerv flex 3\"x5 3\"x10 3\" x 10  3\"x10 3\"x10 3\"x10 3\"x10      SNAGs w/left rot 3\"x5 3\"x10 3\" x 10  3\"x10 3\"x10 3\"x10 3\"x10 B/L 3\"x10     Pencil push ups 2x5 1x10 1 x 10  1x10 1x10 NP       Smooth pursuit      1x10 1x10      saccades    30\" " "ea 30\" ea        UT  stretch   30 \" x 3 B   R SB during L cupping R SB during L cupping R SB during L      Median Nerve glide      1x10 1x10 1x10                               SAQ   2 x 10  B 2x10 2x10 W/ball sq.  5\" 2x10 W/ball  5\"  2x10 W/ball 5\" 2# 1x8 p!,0#1x12     QS w/towel  5\"x10 5\" 2 x 10 L Seated on chair 5\"x10 5\"x10 W/ lateral glide MWM 2x10 W/lateral glide MWM 2x10      Prone press ups  1x10 1 x 10  1x10  1x10 1x10 10     Heel slides   1x10 5\" 2 x 10  1x10 @home today        Leg press w/ball squeeze      22# 1x10 22#  1x10 44# 2x10     Bike warm up    L0x5' L0x5' L0x5' L1x5' L1x5'     Modalities             MH 10'  C-spine x 10 min sit She did not have time 5 min with bike, 5 min seated 5 min on bike 5 min during QS 5 min w/bike 5 min w/bike                         "

## 2025-05-14 ENCOUNTER — APPOINTMENT (OUTPATIENT)
Dept: PHYSICAL THERAPY | Facility: REHABILITATION | Age: 45
End: 2025-05-14
Attending: FAMILY MEDICINE
Payer: COMMERCIAL

## 2025-05-19 ENCOUNTER — OFFICE VISIT (OUTPATIENT)
Dept: PHYSICAL THERAPY | Facility: REHABILITATION | Age: 45
End: 2025-05-19
Attending: FAMILY MEDICINE
Payer: COMMERCIAL

## 2025-05-19 DIAGNOSIS — S06.0X0A CONCUSSION WITHOUT LOSS OF CONSCIOUSNESS, INITIAL ENCOUNTER: Primary | ICD-10-CM

## 2025-05-19 DIAGNOSIS — M25.562 ACUTE PAIN OF LEFT KNEE: ICD-10-CM

## 2025-05-19 PROCEDURE — 97112 NEUROMUSCULAR REEDUCATION: CPT

## 2025-05-19 PROCEDURE — 97530 THERAPEUTIC ACTIVITIES: CPT

## 2025-05-19 PROCEDURE — 97110 THERAPEUTIC EXERCISES: CPT

## 2025-05-19 NOTE — PROGRESS NOTES
"Daily Note     Today's date: 2025  Patient name: Elissa Barraza  : 1980  MRN: [id number]  Referring provider: Ivan Perez MD  Dx:   Encounter Diagnosis     ICD-10-CM    1. Concussion without loss of consciousness, initial encounter  S06.0X0A       2. Acute pain of left knee  M25.562                            Subjective: Patient reports that she was going to cancel her appt today because she's not feeling well. Denied fever, but c/o achiness all over.    Objective: See treatment diary below  Acutely tender over the lateral joint line of the left knee.      Assessment: Fair to good tolerance with exercises with no adverse reactions. Slight increased knee pain with leg press, therefore discontinued. Pt able to complete remainder of exercises as prescribed. Will continue to progress as able. Pt would benefit from continued skilled PT to improve current deficits and maximize function.    Plan: Continue per plan of care.        Precautions: There is no problem list on file for this patient.        Daily Treatment Diary     Assessment     Visit number             Eval/Reval x knee           FOTO missed x   **     **   HEP Issued  x            Manuals             SOR NT NT GF- gentle / supine NT SE NP       STM/MFR  NT brief gentle GF B UT NT SE MFD B 2' ea MFD B  2' ea MFD B 2' ea MFD B 2' ea    Natalya taping for med. glide    NT SE NT SE NT TE                 Exercise Diary                           Cerv ret 3\"x5 3\"x10 3\" x 10  3\"x10 3\"x10 3\"x10 3\"x10 W/op 3\" x10 W/ op 5\"x10    Cerv ext             Cerv flex 3\"x5 3\"x10 3\" x 10  3\"x10 3\"x10 3\"x10 3\"x10      SNAGs w/left rot 3\"x5 3\"x10 3\" x 10  3\"x10 3\"x10 3\"x10 3\"x10 B/L 3\"x10 3\"x10 B    Pencil push ups 2x5 1x10 1 x 10  1x10 1x10 NP       Smooth pursuit      1x10 1x10      saccades    30\" ea 30\" ea        UT  stretch   30 \" x 3 B   R SB during L cupping R SB during L cupping R SB during L R SB during L cupping     " "Median Nerve glide      1x10 1x10 1x10 1x10 B                              SAQ   2 x 10  B 2x10 2x10 W/ball sq.  5\" 2x10 W/ball  5\"  2x10 W/ball 5\" 2# 1x8 p!,0#1x12 1# 5\"x10    QS w/towel  5\"x10 5\" 2 x 10 L Seated on chair 5\"x10 5\"x10 W/ lateral glide MWM 2x10 W/lateral glide MWM 2x10      Prone press ups  1x10 1 x 10  1x10  1x10 1x10 10 1x10    Heel slides   1x10 5\" 2 x 10  1x10 @home today        Leg press w/ball squeeze      22# 1x10 22#  1x10 44# 2x10 44# 1x10, 1x5 p!    Bike warm up    L0x5' L0x5' L0x5' L1x5' L1x5' L1x5'    Modalities             MH 10'  C-spine x 10 min sit She did not have time 5 min with bike, 5 min seated 5 min on bike 5 min during QS 5 min w/bike 5 min w/bike 5 min w/bike                        "

## 2025-05-20 ENCOUNTER — TELEPHONE (OUTPATIENT)
Age: 45
End: 2025-05-20

## 2025-05-20 NOTE — TELEPHONE ENCOUNTER
Lvm for pt to confirm appt for 05/21 with Dr Soriano in our [place] office. Informed pt of $150 consultation fee. Advised pt to callback to r/s or cancel this appt if needed.

## 2025-05-21 ENCOUNTER — CONSULT (OUTPATIENT)
Age: 45
End: 2025-05-21
Payer: COMMERCIAL

## 2025-05-21 ENCOUNTER — OFFICE VISIT (OUTPATIENT)
Dept: PHYSICAL THERAPY | Facility: REHABILITATION | Age: 45
End: 2025-05-21
Attending: FAMILY MEDICINE
Payer: COMMERCIAL

## 2025-05-21 ENCOUNTER — COSMETIC (OUTPATIENT)
Age: 45
End: 2025-05-21

## 2025-05-21 DIAGNOSIS — M25.562 ACUTE PAIN OF LEFT KNEE: ICD-10-CM

## 2025-05-21 DIAGNOSIS — S06.0X0A CONCUSSION WITHOUT LOSS OF CONSCIOUSNESS, INITIAL ENCOUNTER: Primary | ICD-10-CM

## 2025-05-21 PROCEDURE — 97112 NEUROMUSCULAR REEDUCATION: CPT

## 2025-05-21 PROCEDURE — 97140 MANUAL THERAPY 1/> REGIONS: CPT

## 2025-05-21 PROCEDURE — 97110 THERAPEUTIC EXERCISES: CPT

## 2025-05-21 NOTE — PROGRESS NOTES
"Daily Note     Today's date: 2025  Patient name: Elissa Barraza  : 1980  MRN: [id number]  Referring provider: Ivan Perez MD  Dx:   Encounter Diagnosis     ICD-10-CM    1. Concussion without loss of consciousness, initial encounter  S06.0X0A       2. Acute pain of left knee  M25.562                            Subjective: Patient reports her neck does not lock up the way it used to after STM. She reports increased pain in general today which she is unsure if it has to do with the weather.    Objective: See treatment diary below  Acutely tender over the lateral joint line of the left knee.      Assessment: Pt tolerated treatment fair. Reports knee pain that she feels \"inside the knee\", but relief of anterior knee pain with medial glide taping. L sided neck pain increased with SNAGS to L, however subsides with rest. R LS restriction noted today, mobility and pain levels improved with MFD and active release. Will continue to progress as able. Pt would benefit from continued skilled PT to improve current deficits and maximize function.    Plan: Continue per plan of care.        Precautions: There is no problem list on file for this patient.        Daily Treatment Diary     Assessment    Visit number             Eval/Reval x knee           FOTO missed x   **     **   HEP Issued  x            Manuals             SOR NT NT GF- gentle / supine NT SE NP       STM/MFR  NT brief gentle GF B UT NT SE MFD B 2' ea MFD B  2' ea MFD B 2' ea MFD B 2' ea MFD    UT/LS w/art   McConnel taping for med. glide    NT SE NT SE NT TE SE                Exercise Diary                           Cerv ret 3\"x5 3\"x10 3\" x 10  3\"x10 3\"x10 3\"x10 3\"x10 W/op 3\" x10 W/ op 5\"x10 W/OP 5\"x10   Cerv ext             Cerv flex 3\"x5 3\"x10 3\" x 10  3\"x10 3\"x10 3\"x10 3\"x10      SNAGs w/left rot 3\"x5 3\"x10 3\" x 10  3\"x10 3\"x10 3\"x10 3\"x10 B/L 3\"x10 3\"x10 B 3\"x10 B   Pencil push ups 2x5 1x10 1 x 10  " "1x10 1x10 NP       Smooth pursuit      1x10 1x10      saccades    30\" ea 30\" ea        UT  stretch   30 \" x 3 B   R SB during L cupping R SB during L cupping R SB during L R SB during L cupping R SB during L cupping    L LS 10\"x5    Median Nerve glide      1x10 1x10 1x10 1x10 B 1x10 B                             SAQ   2 x 10  B 2x10 2x10 W/ball sq.  5\" 2x10 W/ball  5\"  2x10 W/ball 5\" 2# 1x8 p!,0#1x12 1# 5\"x10 1#  5\"x10   QS w/towel  5\"x10 5\" 2 x 10 L Seated on chair 5\"x10 5\"x10 W/ lateral glide MWM 2x10 W/lateral glide MWM 2x10      Prone press ups  1x10 1 x 10  1x10  1x10 1x10 10 1x10 1x10   Heel slides   1x10 5\" 2 x 10  1x10 @home today        Leg press w/ball squeeze      22# 1x10 22#  1x10 44# 2x10 44# 1x10, 1x5 p! np   Bike warm up    L0x5' L0x5' L0x5' L1x5' L1x5' L1x5' L1x5'   Modalities             MH 10'  C-spine x 10 min sit She did not have time 5 min with bike, 5 min seated 5 min on bike 5 min during QS 5 min w/bike 5 min w/bike 5 min w/bike 5 mn w/bike                       "

## 2025-05-23 ENCOUNTER — EVALUATION (OUTPATIENT)
Dept: OCCUPATIONAL THERAPY | Facility: CLINIC | Age: 45
End: 2025-05-23
Attending: FAMILY MEDICINE
Payer: COMMERCIAL

## 2025-05-23 DIAGNOSIS — R41.840 CONCENTRATION DEFICIT: Primary | ICD-10-CM

## 2025-05-23 PROCEDURE — 97166 OT EVAL MOD COMPLEX 45 MIN: CPT

## 2025-05-23 PROCEDURE — 96125 COGNITIVE TEST BY HC PRO: CPT
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
## 2025-05-27 ENCOUNTER — OFFICE VISIT (OUTPATIENT)
Dept: PHYSICAL THERAPY | Facility: REHABILITATION | Age: 45
End: 2025-05-27
Attending: FAMILY MEDICINE
Payer: COMMERCIAL

## 2025-05-27 DIAGNOSIS — S06.0X0A CONCUSSION WITHOUT LOSS OF CONSCIOUSNESS, INITIAL ENCOUNTER: Primary | ICD-10-CM

## 2025-05-27 DIAGNOSIS — M25.562 ACUTE PAIN OF LEFT KNEE: ICD-10-CM

## 2025-05-27 PROCEDURE — 97112 NEUROMUSCULAR REEDUCATION: CPT

## 2025-05-27 PROCEDURE — 97110 THERAPEUTIC EXERCISES: CPT

## 2025-05-27 PROCEDURE — 97140 MANUAL THERAPY 1/> REGIONS: CPT

## 2025-05-27 NOTE — PROGRESS NOTES
"Daily Note     Today's date: 2025  Patient name: Elissa Barraza  : 1980  MRN: [id number]  Referring provider: Ivan Perez MD  Dx:   Encounter Diagnosis     ICD-10-CM    1. Concussion without loss of consciousness, initial encounter  S06.0X0A       2. Acute pain of left knee  M25.562                            Subjective: Patient reports she woke up with her knee very swollen and painful 2 days after last visit. She reports her neck pain also increased, however is now back to baseline. She denies change in activity after that acute episode of knee pain/swelling. She further states that something as simple as wearing her keys on the lanyard around her neck feels heavy. She had one session of OT, however still needs to complete visual testing for eval [facility]. She further states she was referred to Dr. Saenz for her knee by her .    Objective: See treatment diary below    Assessment: Pt tolerated treatment fair. Unable to tolerate full time on bike, c/o anterior knee pain below patella. Focused on cervical mobility and reduction of symptoms on this date. No interventions seem to assist with pain relief. Pt reports visual changes, stating \"foggy\" upon completion of manual techniques. Takes a few mins of rest to relieve. Pt denies notable changes in symptoms post treatment. Pt would benefit from continued skilled PT to improve current deficits and maximize function.    Plan: Continue per plan of care.        Precautions: There is no problem list on file for this patient.        Daily Treatment Diary     Assessment    Visit number             Eval/Reval  knee           FOTO  x   **     **   HEP Issued              Manuals             SOR  NT GF- gentle / supine NT SE NP       STM/MFR  NT brief gentle GF B UT NT SE MFD B 2' ea MFD B  2' ea MFD B 2' ea MFD B 2' ea MFD    UT/LS w/art   Natalya taping for med. glide SE   NT SE NT SE NT TE SE              " "  Exercise Diary                           Cerv ret  3\"x10 3\" x 10  3\"x10 3\"x10 3\"x10 3\"x10 W/op 3\" x10 W/ op 5\"x10 W/OP 5\"x10   Cerv ext             Cerv flex  3\"x10 3\" x 10  3\"x10 3\"x10 3\"x10 3\"x10      SNAGs w/left rot 3\"x10 B 3\"x10 3\" x 10  3\"x10 3\"x10 3\"x10 3\"x10 B/L 3\"x10 3\"x10 B 3\"x10 B   Pencil push ups  1x10 1 x 10  1x10 1x10 NP       Smooth pursuit      1x10 1x10      saccades    30\" ea 30\" ea        UT  stretch R SB during L cupping    L LS  5\"x10  30 \" x 3 B   R SB during L cupping R SB during L cupping R SB during L R SB during L cupping R SB during L cupping    L LS 10\"x5    Median Nerve glide      1x10 1x10 1x10 1x10 B 1x10 B                             SAQ np  2 x 10  B 2x10 2x10 W/ball sq.  5\" 2x10 W/ball  5\"  2x10 W/ball 5\" 2# 1x8 p!,0#1x12 1# 5\"x10 1#  5\"x10   QS w/towel  5\"x10 5\" 2 x 10 L Seated on chair 5\"x10 5\"x10 W/ lateral glide MWM 2x10 W/lateral glide MWM 2x10      Prone press ups  1x10 1 x 10  1x10  1x10 1x10 10 1x10 1x10   Heel slides   1x10 5\" 2 x 10  1x10 @home today        Leg press w/ball squeeze      22# 1x10 22#  1x10 44# 2x10 44# 1x10, 1x5 p! np   Bike warm up L1x4.5'   L0x5' L0x5' L0x5' L1x5' L1x5' L1x5' L1x5'   Modalities             MH declined  C-spine x 10 min sit She did not have time 5 min with bike, 5 min seated 5 min on bike 5 min during QS 5 min w/bike 5 min w/bike 5 min w/bike 5 mn w/bike                       "

## 2025-05-28 ENCOUNTER — EVALUATION (OUTPATIENT)
Dept: PHYSICAL THERAPY | Facility: REHABILITATION | Age: 45
End: 2025-05-28
Attending: FAMILY MEDICINE
Payer: COMMERCIAL

## 2025-05-28 DIAGNOSIS — M25.562 ACUTE PAIN OF LEFT KNEE: ICD-10-CM

## 2025-05-28 DIAGNOSIS — S06.0X0A CONCUSSION WITHOUT LOSS OF CONSCIOUSNESS, INITIAL ENCOUNTER: Primary | ICD-10-CM

## 2025-05-28 PROCEDURE — 97110 THERAPEUTIC EXERCISES: CPT | Performed by: PHYSICAL THERAPIST

## 2025-05-28 PROCEDURE — 97164 PT RE-EVAL EST PLAN CARE: CPT | Performed by: PHYSICAL THERAPIST

## 2025-05-28 PROCEDURE — 97014 ELECTRIC STIMULATION THERAPY: CPT | Performed by: PHYSICAL THERAPIST

## 2025-05-28 NOTE — PROGRESS NOTES
Daily Note     Today's date: 2025  Patient name: Elissa Barraza  : 1980  MRN: [id number]  Referring provider: Ivan Perez MD  Dx:   Encounter Diagnosis     ICD-10-CM    1. Concussion without loss of consciousness, initial encounter  S06.0X0A       2. Acute pain of left knee  M25.562                              Subjective: Headaches occur about every other day.  Yesterday she had almost a 10/10 headache in her left temporal region.    Knee pain is rated 7/10 today.  At times it will increase and be difficult to stand.  She notes that this has happened 2 days after PT.     Objective: See treatment diary below  Squat 50%  Strength  Knee flex:   R 5/5  L 4/5 pain  Knee ext:  R 5/5  L 5/5  Hip:  Flex: R 5/5 L 4/5pain  Ext: R 5/5 L 5/5 pain  Abd: R 4/5  L 4/5        Knee ROM R 0-130  L 0-125    (-)Alexander, Varus, Valgus at 0, (+) gr 1 laxity valgus at 30.  , (-) ant and post drawer   (+) SLR on the L    Mechanical assessment:  REIL: decreased thigh pain, better    Concurrent Complaints  Positive for headaches, visual change, memory loss and poor concentration.     Palpation   Left   Hypertonic in the scalenes, sternocleidomastoid and suboccipitals.   Muscle spasm in the cervical paraspinals, levator scapulae and upper trapezius.   Tenderness of the cervical paraspinals, levator scapulae, scalenes, sternocleidomastoid, suboccipitals and upper trapezius.     Right   Hypertonic in suboccipitals.   Muscle spasm in the levator scapulae and upper trapezius.   Tenderness of the cervical paraspinals, and upper trapezius.     Active Range of Motion   Cervical/Thoracic Spine       Cervical  Subcranial protraction:  with pain   Restriction level: nil  Subcranial retraction:  with pain   Restriction level: maximal  Flexion: 50 degrees   Extension: 32 degrees      Left lateral flexion: 35 degrees      Right lateral flexion: 30 degrees pain on left     Left rotation: 55  Right rotation: 55 degrees    with pain    Joint  Play     Hypomobile: C1 and C2     Pain: C1 and C2     Strength/Myotome Testing   Cervical Spine     Left   Interossei strength (t1): 5    Right   Interossei strength (t1): 5    Left Shoulder     Planes of Motion   Flexion: 4     Right Shoulder     Planes of Motion   Flexion: 5    Left Elbow   Flexion: 5  Extension: 5    Right Elbow   Flexion: 5  Extension: 5    Left Wrist/Hand   Wrist extension: 5  Wrist flexion: 5  Thumb extension: 5     (2nd hand position)     Trial 1: 19    Trial 2: 20    Trial 3: 22      Right Wrist/Hand   Wrist extension: 5  Wrist flexion: 5  Thumb extension: 5     (2nd hand position)     Trial 1: 55    Trial 2: 51    Trial 3: 40      Tests   Cervical   Negative alar ligament test and Sharp-Stacey test.     Left   Negative Spurling's Test A.     Right   Negative Spurling's Test A.   Neuro Exam:     Dizziness  Positive for disequilibrium,and light-headedness.   Negative for oscillopsia, motion sickness, rocking or swaying, diplopia and floating or swimming,  vertigo    Exacerbating factors  Positive for initiating walking and walking in busy environment.   Negative for bending over, rolling in bed, looking up, turning head, supine to/from sitting and optokinetic movement.         Oculomotor exam   Gaze holding nystagmus: not present left  and not present right  Smooth pursuits: within normal limits  Vertical saccades: normal left eye symptoms  Horizontal saccades: normal left eye symptoms  Convergence: abnormal (28 cm)(Left eye does not move toward midline during convergence)  Cover test: normal   Head thrust: left abnormal and right abnormal    Assessment: Elissa is making progress with increased cervical and knee ROM and UE strength.  She continues to have significant pain and spasm in the cervical region and resultant neck, upper thoracic and head pain.  Her left knee and thigh pain appears to have a dural component and she was advised to perform REIL 5x a day until she f/u with us  "again to address this issue.  She continues to have left eye pain with saccades and tracking and abnormal movement with convergence. She will f/u with OT about this issue and we will discuss whether a neuro referral is advised.  Pt would benefit from continued skilled PT to improve current deficits and maximize function.    Goals 5/28  STG: in 4 week these were not met: continue with the same STG  Increase CROM to WNL  Improve convergence 2 cm  Decrease pain to 6/10  LTG by d/c  UE strength WNL  Able to drive without impairment  Pain <4/10  LE strength WNL  Normal oculomotor function    Plan: Continue per plan of care.        Precautions: There is no problem list on file for this patient.        Daily Treatment Diary     Assessment 5/27 5/28 4/28 4/30 5/5 5/7 5/12 5/19 5/21   Visit number             Abraham/Reval             FOTO     **     **   HEP Issued              Manuals             SOR  NT  NT SE NP       STM/MFR    NT SE MFD B 2' ea MFD B  2' ea MFD B 2' ea MFD B 2' ea MFD    UT/LS w/art   Natalya taping for med. glide SE   NT SE NT SE NT TE SE                Exercise Diary                 Cerv siva          Cerv ret  Supine 50% 5\"x10  3\"x10 3\"x10 3\"x10 3\"x10 W/op 3\" x10 W/ op 5\"x10 W/OP 5\"x10   Cerv ext             Cerv flex    3\"x10 3\"x10 3\"x10 3\"x10      SNAGs w/left rot 3\"x10 B   3\"x10 3\"x10 3\"x10 3\"x10 B/L 3\"x10 3\"x10 B 3\"x10 B   Pencil push ups    1x10 1x10 NP       Smooth pursuit      1x10 1x10      saccades    30\" ea 30\" ea        UT  stretch R SB during L cupping    L LS  5\"x10     R SB during L cupping R SB during L cupping R SB during L R SB during L cupping R SB during L cupping    L LS 10\"x5    Median Nerve glide      1x10 1x10 1x10 1x10 B 1x10 B                             SAQ np    2x10 W/ball sq.  5\" 2x10 W/ball  5\"  2x10 W/ball 5\" 2# 1x8 p!,0#1x12 1# 5\"x10 1#  5\"x10   QS w/towel     5\"x10 W/ lateral glide MWM 2x10 W/lateral glide MWM 2x10      Prone press ups  1x10    1x10 1x10 10 1x10 1x10 "   Heel slides      @home today        Leg press w/ball squeeze      22# 1x10 22#  1x10 44# 2x10 44# 1x10, 1x5 p! np   Bike warm up L1x4.5'    L0x5' L0x5' L1x5' L1x5' L1x5' L1x5'   Modalities             MH declined W/TENS 10 min paraspinals and levator   5 min with bike, 5 min seated 5 min on bike 5 min during QS 5 min w/bike 5 min w/bike 5 min w/bike 5 mn w/bike

## 2025-06-02 ENCOUNTER — OFFICE VISIT (OUTPATIENT)
Dept: PHYSICAL THERAPY | Facility: REHABILITATION | Age: 45
End: 2025-06-02
Attending: FAMILY MEDICINE
Payer: COMMERCIAL

## 2025-06-02 DIAGNOSIS — M25.562 ACUTE PAIN OF LEFT KNEE: ICD-10-CM

## 2025-06-02 DIAGNOSIS — S06.0X0A CONCUSSION WITHOUT LOSS OF CONSCIOUSNESS, INITIAL ENCOUNTER: Primary | ICD-10-CM

## 2025-06-02 PROCEDURE — 97110 THERAPEUTIC EXERCISES: CPT | Performed by: PHYSICAL THERAPIST

## 2025-06-02 PROCEDURE — 97140 MANUAL THERAPY 1/> REGIONS: CPT | Performed by: PHYSICAL THERAPIST

## 2025-06-02 PROCEDURE — 97014 ELECTRIC STIMULATION THERAPY: CPT | Performed by: PHYSICAL THERAPIST

## 2025-06-02 NOTE — PROGRESS NOTES
"Daily Note     Today's date: 2025  Patient name: Elissa Barraza  : 1980  MRN: [id number]  Referring provider: Ivan Perez MD  Dx:   Encounter Diagnosis     ICD-10-CM    1. Concussion without loss of consciousness, initial encounter  S06.0X0A       2. Acute pain of left knee  M25.562                                Subjective: Pt reports that she felt cramps everywhere after last PT appointment.  She took a muscle relaxer and this reduced the pain.  She felt that the TENS unit felt good.     Objective: See treatment diary below        Assessment: SLR were challenging but were not significantly painful.  She did note some pain on the second set of SAQ.  Attempted convergence with one eye at a time.  She was not able to do this with the left eye without burning.  Pt would benefit from continued skilled PT to improve current deficits and maximize function. If SLR and isometrics did not increase pain we will add them to HEP next visit.      Goals   STG: in 4 week these were not met: continue with the same STG  Increase CROM to WNL  Improve convergence 2 cm  Decrease pain to 6/10  LTG by d/c  UE strength WNL  Able to drive without impairment  Pain <4/10  LE strength WNL  Normal oculomotor function    Plan: Continue per plan of care.        Precautions: There is no problem list on file for this patient.        Daily Treatment Diary     Assessment    Visit number             Eval/Reval             FOTO     **     **   HEP Issued              Manuals             SOR  NT NT  SE NP       STM/MFR     SE MFD B 2' ea MFD B  2' ea MFD B 2' ea MFD B 2' ea MFD    UT/LS w/art   Natalya taping for med. glide SE    SE NT SE NT TE SE                Exercise Diary                           Cerv ret  Supine 50% 5\"x10 Supine 50% 5\"x10  3\"x10 3\"x10 3\"x10 W/op 3\" x10 W/ op 5\"x10 W/OP 5\"x10   Cerv ext             Cerv flex     3\"x10 3\"x10 3\"x10      SNAGs w/left rot 3\"x10 B  L 3\"x10  " "3\"x10 3\"x10 3\"x10 B/L 3\"x10 3\"x10 B 3\"x10 B   Pencil push ups   R eye only.   1x10 NP       Smooth pursuit      1x10 1x10      SB siva   5\"x5  30\" ea        UT  stretch R SB during L cupping    L LS  5\"x10  10\"x5   R SB during L cupping R SB during L cupping R SB during L R SB during L cupping R SB during L cupping    L LS 10\"x5    Median Nerve glide      1x10 1x10 1x10 1x10 B 1x10 B                SLR   2x5          SAQ np  No ball 1# 2x10  2x10 W/ball sq.  5\" 2x10 W/ball  5\"  2x10 W/ball 5\" 2# 1x8 p!,0#1x12 1# 5\"x10 1#  5\"x10   QS w/towel     5\"x10 W/ lateral glide MWM 2x10 W/lateral glide MWM 2x10      Prone press ups  1x10 1x10   1x10 1x10 10 1x10 1x10   Heel slides      @home today        Leg press w/ball squeeze      22# 1x10 22#  1x10 44# 2x10 44# 1x10, 1x5 p! np   Bike warm up L1x4.5'  L1x5'  L0x5' L0x5' L1x5' L1x5' L1x5' L1x5'   Modalities             MH declined W/TENS 10 min paraspinals and levator W/TENS  15'  5 min with bike, 5 min seated 5 min on bike 5 min during QS 5 min w/bike 5 min w/bike 5 min w/bike 5 mn w/bike                       "

## 2025-06-03 ENCOUNTER — OFFICE VISIT (OUTPATIENT)
Dept: OCCUPATIONAL THERAPY | Facility: CLINIC | Age: 45
End: 2025-06-03
Attending: FAMILY MEDICINE
Payer: COMMERCIAL

## 2025-06-03 DIAGNOSIS — R41.840 CONCENTRATION DEFICIT: Primary | ICD-10-CM

## 2025-06-03 PROCEDURE — 97530 THERAPEUTIC ACTIVITIES: CPT

## 2025-06-03 PROCEDURE — 97150 GROUP THERAPEUTIC PROCEDURES: CPT
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
## 2025-06-04 ENCOUNTER — OFFICE VISIT (OUTPATIENT)
Dept: PHYSICAL THERAPY | Facility: REHABILITATION | Age: 45
End: 2025-06-04
Attending: FAMILY MEDICINE
Payer: COMMERCIAL

## 2025-06-04 DIAGNOSIS — M25.562 ACUTE PAIN OF LEFT KNEE: ICD-10-CM

## 2025-06-04 DIAGNOSIS — S06.0X0A CONCUSSION WITHOUT LOSS OF CONSCIOUSNESS, INITIAL ENCOUNTER: Primary | ICD-10-CM

## 2025-06-04 PROCEDURE — 97140 MANUAL THERAPY 1/> REGIONS: CPT

## 2025-06-04 PROCEDURE — 97110 THERAPEUTIC EXERCISES: CPT

## 2025-06-04 PROCEDURE — 97014 ELECTRIC STIMULATION THERAPY: CPT

## 2025-06-04 NOTE — PROGRESS NOTES
"Daily Note     Today's date: 2025  Patient name: Elissa Barraza  : 1980  MRN: [id number]  Referring provider: Ivan Perez MD  Dx:   Encounter Diagnosis     ICD-10-CM    1. Concussion without loss of consciousness, initial encounter  S06.0X0A       2. Acute pain of left knee  M25.562                                Subjective: Increased knee and neck pain with significant frontal head/eye pressure following last session. Required to sleep it off.     Objective: See treatment diary below        Assessment: Cervical exercises triggered frontal HA (5/10). Denies dizziness or visual changes. Only able to tolerate 8 mins of SOR due to increasing HA and pressure; patient requested to stop. Improved symptoms post modalities.  Pt would benefit from continued skilled PT to improve current deficits and maximize function.     Goals   STG: in 4 week these were not met: continue with the same STG  Increase CROM to WNL  Improve convergence 2 cm  Decrease pain to 6/10  LTG by d/c  UE strength WNL  Able to drive without impairment  Pain <4/10  LE strength WNL  Normal oculomotor function    Plan: Continue per plan of care.        Precautions: There is no problem list on file for this patient.        Daily Treatment Diary     Assessment    Visit number             Abraham/Reval             FOTO     **     **   HEP Issued              Manuals             SOR  NT NT CM  NP       STM/MFR      MFD B 2' ea MFD B  2' ea MFD B 2' ea MFD B 2' ea MFD    UT/LS w/art   Natalya taping for med. glide SE     NT SE NT TE SE                Exercise Diary                           Cerv ret  Supine 50% 5\"x10 Supine 50% 5\"x10 Supine 50%  5\"x10  3\"x10 3\"x10 W/op 3\" x10 W/ op 5\"x10 W/OP 5\"x10   Cerv ext             Cerv flex      3\"x10 3\"x10      SNAGs w/left rot 3\"x10 B  L 3\"x10 L 3\" x10  3\"x10 3\"x10 B/L 3\"x10 3\"x10 B 3\"x10 B   Pencil push ups   R eye only.  D/C  NP       Smooth pursuit      " "1x10 1x10      SB siva   5\"x5 5\"x5          UT  stretch R SB during L cupping    L LS  5\"x10  10\"x5 10\"x5  R SB during L cupping R SB during L cupping R SB during L R SB during L cupping R SB during L cupping    L LS 10\"x5    Median Nerve glide      1x10 1x10 1x10 1x10 B 1x10 B                SLR   2x5 2x5         SAQ np  No ball 1# 2x10 No ball  1#  2x10  W/ball sq.  5\" 2x10 W/ball  5\"  2x10 W/ball 5\" 2# 1x8 p!,0#1x12 1# 5\"x10 1#  5\"x10   QS w/towel      W/ lateral glide MWM 2x10 W/lateral glide MWM 2x10      Prone press ups  1x10 1x10 1x10  1x10 1x10 10 1x10 1x10   Heel slides              Leg press w/ball squeeze      22# 1x10 22#  1x10 44# 2x10 44# 1x10, 1x5 p! np   Bike warm up L1x4.5'  L1x5' L1x5'  L0x5' L1x5' L1x5' L1x5' L1x5'   Modalities    6/4         MH declined W/TENS 10 min paraspinals and levator W/TENS  15' W/ TENS15'  5 min on bike 5 min during QS 5 min w/bike 5 min w/bike 5 min w/bike 5 mn w/bike                       "

## 2025-06-05 ENCOUNTER — OFFICE VISIT (OUTPATIENT)
Dept: OCCUPATIONAL THERAPY | Facility: CLINIC | Age: 45
End: 2025-06-05
Attending: FAMILY MEDICINE
Payer: COMMERCIAL

## 2025-06-05 DIAGNOSIS — R41.840 CONCENTRATION DEFICIT: Primary | ICD-10-CM

## 2025-06-05 PROCEDURE — 97530 THERAPEUTIC ACTIVITIES: CPT

## 2025-06-05 NOTE — PROGRESS NOTES
"Occupational Therapy Daily Note:    Today's date: 2025  Patient name: Elissa Barraza  : 1980  MRN: [id number]  Referring provider: Ivan Perez MD  Dx:   Encounter Diagnosis   Name Primary?    Concentration deficit Yes         POC expires Unit limit Auth Expiration date PT/OT/ST + Visit Limit?   25  No auth 60 PT/OT/ST PCY                           Visit/Unit Tracking  AUTH Status:  Date 5/23 6/3 6/5           60 visits through 2025 Used 1 2 3            Remaining  59 58 57                Duration in weeks: 8  Plan of care beginning date: 25  Plan of care expiration date: 25  PN #1 due: 25    Precautions: diplopia, headaches      Subjective: pt stated \"no headache today\"    Objective: See treatment below.     There Act:   Pt participated in BITS four square saccades  working on visual saccades, tracking and near/far point.   Trial - 1 : 3:55  Activity was upgraded to include adding matrix cubes to increase vertical/horizontal saccades demand.   Trial 2 - 13:05    Pt participated in maze worksheet at near point while focusing on increasing tracking in vertical/ horizontal planes and near point convergence. Pt took frequent breaks to readjust eyes and wipe them 2/2 watery eyes.  Pt completed the worksheet with 0 mistakes.     Pt completed one card of spot it locating 2 difference 3 differences at end of session working on increasing horizontal saccades tolerance.       Assessment: Tolerated treatment well. Patient would benefit from continued skilled OT. Reported mild dizziness while working on matrix and BITS activity. Pt demo challenges visually following the pattern on the BITS 2/2 decreased visual saccade losing her place on the board 2 times. Pt reported the brightness of the screen gave a slight pressure headache. Pt reported colors under the letters made it challenging for her to locate letters in the matrix.  Pt demo challenges maintaining near point convergence sitting " far back in her seat 2/2 convergence insufficiency. Pt reported double vision seeing two of the lines getting worse as time progress. Pt reports that her left eye had discomfort for most of the session especially during the maze activity.     Plan: Continued skilled OT per POC

## 2025-06-09 ENCOUNTER — OFFICE VISIT (OUTPATIENT)
Dept: PHYSICAL THERAPY | Facility: REHABILITATION | Age: 45
End: 2025-06-09
Attending: FAMILY MEDICINE
Payer: COMMERCIAL

## 2025-06-09 DIAGNOSIS — M25.562 ACUTE PAIN OF LEFT KNEE: ICD-10-CM

## 2025-06-09 DIAGNOSIS — S06.0X0A CONCUSSION WITHOUT LOSS OF CONSCIOUSNESS, INITIAL ENCOUNTER: Primary | ICD-10-CM

## 2025-06-09 PROCEDURE — 97530 THERAPEUTIC ACTIVITIES: CPT

## 2025-06-09 PROCEDURE — 97140 MANUAL THERAPY 1/> REGIONS: CPT

## 2025-06-09 PROCEDURE — 97110 THERAPEUTIC EXERCISES: CPT

## 2025-06-09 NOTE — PROGRESS NOTES
"Daily Note     Today's date: 2025  Patient name: Elissa Barraza  : 1980  MRN: [id number]  Referring provider: Ivan Perez MD  Dx:   Encounter Diagnosis     ICD-10-CM    1. Concussion without loss of consciousness, initial encounter  S06.0X0A       2. Acute pain of left knee  M25.562                                Subjective: pt reports having an MRI on Friday for her knee and has f/u scheduled with the orthopedic doctor. Denied HA and dizziness prior to beginning today, but pain in upper cervical region.    Objective: See treatment diary below        Assessment: Good to fair tolerance with exercises with no adverse reactions. Fatigues with SLR's, but able to complete full dosage. Pressure/tightness reported post cervical exercises. Positive relief with manuals. Pt would benefit from continued skilled PT to improve current deficits and maximize function.       Plan: Continue per plan of care.        Precautions: There is no problem list on file for this patient.        Daily Treatment Diary     Assessment    Visit number             Eval/al             FOTO     **     **   HEP Issued              Manuals             SOR  NT NT CM TE        STM/MFR     TE brief  MFD B  2' ea MFD B 2' ea MFD B 2' ea MFD    UT/LS w/art   Natalya taping for med. glide SE    TE  SE NT TE SE                Exercise Diary                           Cerv ret  Supine 50% 5\"x10 Supine 50% 5\"x10 Supine 50%  5\"x10 Supine 50%  5\"x10  3\"x10 W/op 3\" x10 W/ op 5\"x10 W/OP 5\"x10   Cerv ext             Cerv flex       3\"x10      SNAGs w/left rot 3\"x10 B  L 3\"x10 L 3\" x10 L 3\"x10  3\"x10 B/L 3\"x10 3\"x10 B 3\"x10 B   Pencil push ups   R eye only.  D/C         Smooth pursuit       1x10      SB siva   5\"x5 5\"x5  5\"x5        UT  stretch R SB during L cupping    L LS  5\"x10  10\"x5 10\"x5 10\"x5 B  R SB during L cupping R SB during L R SB during L cupping R SB during L cupping    L LS 10\"x5    Median " "Nerve glide       1x10 1x10 1x10 B 1x10 B                SLR   2x5 2x5 2x10        SAQ np  No ball 1# 2x10 No ball  1#  2x10 No ball 1#  2x10  W/ball  5\"  2x10 W/ball 5\" 2# 1x8 p!,0#1x12 1# 5\"x10 1#  5\"x10   QS w/towel       W/lateral glide MWM 2x10      Prone press ups  1x10 1x10 1x10 1x10  1x10 10 1x10 1x10   Heel slides              Leg press w/ball squeeze       22#  1x10 44# 2x10 44# 1x10, 1x5 p! np   Bike warm up L1x4.5'  L1x5' L1x5' L1x5'  L1x5' L1x5' L1x5' L1x5'   Modalities    6/4         MH declined W/TENS 10 min paraspinals and levator W/TENS  15' W/ TENS15' W/ tens x10'  5 min w/bike 5 min w/bike 5 min w/bike 5 mn w/bike                       "

## 2025-06-10 ENCOUNTER — OFFICE VISIT (OUTPATIENT)
Dept: OCCUPATIONAL THERAPY | Facility: CLINIC | Age: 45
End: 2025-06-10
Attending: FAMILY MEDICINE
Payer: COMMERCIAL

## 2025-06-10 DIAGNOSIS — R41.840 CONCENTRATION DEFICIT: Primary | ICD-10-CM

## 2025-06-10 PROCEDURE — 97530 THERAPEUTIC ACTIVITIES: CPT

## 2025-06-10 NOTE — PROGRESS NOTES
"Occupational Therapy Daily Note:    Today's date: 6/10/2025  Patient name: Elissa Barraza  : 1980  MRN: [id number]  Referring provider: Ivan Perez MD  Dx:   Encounter Diagnosis   Name Primary?    Concentration deficit Yes          POC expires Unit limit Auth Expiration date PT/OT/ST + Visit Limit?   25  No auth 60 PT/OT/ST PCY                           Visit/Unit Tracking  AUTH Status:  Date 5/23 6/3 6/5           60 visits through 2025 Used 1 2 3            Remaining  59 58 57                Duration in weeks: 8  Plan of care beginning date: 25  Plan of care expiration date: 25  PN #1 due: 25    Precautions: diplopia, headaches        Subjective: \"I use the timer on my phone all the time.\"    Objective: See treatment below.  Session focusing on cognitive skills in mutli modal environment improving safety and indep in ADL/IADL's.    Thera Act:  Educated pt on internal/external memory strategies improving participation in conversation retention of new information.  Pt verbalized she would like to or is currently using:  Chunking/categorization  Phone timer/calendar  List making (writing things down)              My Chart/calendar    Pt completed chunking activity, categorizing 12 items into into 3 groups, pt able to recall 12/12 items followed by recall of picture scene.  Pt provided with 90 sec study time for recall of beach scene. Pt able to recall >75% of scene using association and writing things down memory strategy w/<25% clues for recall.        Session concluded with Brain Box focusing on visual memory, pt provided with 30 sec study time followed by answering detailed questions of picture.  Pt completed 7 cards with w/75% accuracy.     Assessment: Tolerated treatment well. Pt noted w/G abilities for Brain Box recall.  Pt improved in recall when able to write answers down.  Patient would benefit from continued skilled OT.    Plan: Continued skilled OT per POC    "

## 2025-06-11 ENCOUNTER — OFFICE VISIT (OUTPATIENT)
Dept: PHYSICAL THERAPY | Facility: REHABILITATION | Age: 45
End: 2025-06-11
Attending: FAMILY MEDICINE
Payer: COMMERCIAL

## 2025-06-11 DIAGNOSIS — S06.0X0A CONCUSSION WITHOUT LOSS OF CONSCIOUSNESS, INITIAL ENCOUNTER: Primary | ICD-10-CM

## 2025-06-11 DIAGNOSIS — M25.562 ACUTE PAIN OF LEFT KNEE: ICD-10-CM

## 2025-06-11 PROCEDURE — 97110 THERAPEUTIC EXERCISES: CPT | Performed by: PHYSICAL THERAPIST

## 2025-06-11 PROCEDURE — 97530 THERAPEUTIC ACTIVITIES: CPT | Performed by: PHYSICAL THERAPIST

## 2025-06-11 NOTE — PROGRESS NOTES
"Daily Note     Today's date: 2025  Patient name: Elissa Barraza  : 1980  MRN: [id number]  Referring provider: Ivan Perez MD  Dx:   Encounter Diagnosis     ICD-10-CM    1. Concussion without loss of consciousness, initial encounter  S06.0X0A       2. Acute pain of left knee  M25.562                                Subjective: Pt reports that there is a constant pain in her knee.  She notes that her neck always feels tight.  She notes that after she is home a little while her neck gets tighter.  Her neck also tightens up after she does her neck exercises at home.      Objective: See treatment diary below        Assessment: Knee pain continues to be constant.  Activities in PT and at home appear to increase neck pain.  I cut them back to one activity only and will gradually adjust the program to increase activity level.   Pt would benefit from continued skilled PT to improve current deficits and maximize function.       Plan: Continue per plan of care.        Precautions: There is no problem list on file for this patient.        Daily Treatment Diary     Assessment        Visit number             Abraham/Reval             FOTO     **        HEP Issued              Manuals             SOR  NT NT CM TE hold       STM/MFR     TE brief hold       Natalya taping for med. glide SE    TE                     Exercise Diary                           Cerv ret  Supine 50% 5\"x10 Supine 50% 5\"x10 Supine 50%  5\"x10 Supine 50%  5\"x10 Hold today       Cerv ext             Cerv flex             SNAGs w/left rot 3\"x10 B  L 3\"x10 L 3\" x10 L 3\"x10 D/c       Pencil push ups   R eye only.  D/C         Shoulder shrugs             SB siva   5\"x5 5\"x5  5\"x5 Hold today       UT  stretch R SB during L cupping    L LS  5\"x10  10\"x5 10\"x5 10\"x5 B Hold today        Median Nerve glide             SA quad siva at 80 deg      5\"x10       SLR   2x5 2x5 2x10 2x10                    SAQ np  No ball 1# 2x10 No " "ball  1#  2x10 No ball 1#  2x10 2#R 2x10 L x10, x9       QS w/towel             Prone press ups  1x10 1x10 1x10 1x10 1x10, w/op 1x7       Hip abd TB      5\"x10       Leg press w/ball squeeze      Ball press at wall NV       Bike warm up L1x4.5'  L1x5' L1x5' L1x5' L1x5'       Modalities    6/4         MH declined W/TENS 10 min paraspinals and levator W/TENS  15' W/ TENS15' W/ tens x10' D/C                           "

## 2025-06-12 ENCOUNTER — OFFICE VISIT (OUTPATIENT)
Dept: OCCUPATIONAL THERAPY | Facility: CLINIC | Age: 45
End: 2025-06-12
Attending: FAMILY MEDICINE
Payer: COMMERCIAL

## 2025-06-12 DIAGNOSIS — R41.840 CONCENTRATION DEFICIT: Primary | ICD-10-CM

## 2025-06-12 PROCEDURE — 97530 THERAPEUTIC ACTIVITIES: CPT

## 2025-06-12 NOTE — PROGRESS NOTES
"Occupational Therapy Daily Note:    Today's date: 2025  Patient name: Elissa Barraza  : 1980  MRN: [id number]  Referring provider: Ivan Perez MD  Dx:   Encounter Diagnosis   Name Primary?    Concentration deficit Yes          POC expires Unit limit Auth Expiration date PT/OT/ST + Visit Limit?   25  No auth 60 PT/OT/ST PCY                           Visit/Unit Tracking  AUTH Status:  Date 5/23 6/3 6/5 6/12          60 visits through 2025 Used 1 2 3 4           Remaining  59 58 57 56               Duration in weeks: 8  Plan of care beginning date: 25  Plan of care expiration date: 25  PN #1 due: 25    Precautions: diplopia, headaches        Subjective: \"I think this bright paper is bothering my eyes.\"    Objective: See treatment below.  Session focusing on cognitive skills in mutli modal environment improving safety and indep in ADL/IADL's.    Thera Act:  Auditory processing with immediate recall of 3 word sets followed by retrieval of words from clutter positioned tabletop providing additional distraction and visual stimuli w/activity focusing on immediate/delayed recall, visual/mental processing, attention, scanning and ocular tolerance.  Pt accurately completed 5 sets grading activity up to 2 min timed delay while engaging in IQ FIT.  Pt progressed in complexity of IQ FIT completing 5 patterns.  Pt progressed from 3 to 4 word sets completing 5 sets w/successful recall.       Assessment: Tolerated treatment well. Pt required additional time to identify words 2* increased visual stimuli.  Pt reported 5/10 HA upon arrival to OT session. Pt noted with G immediate/delayed recall this session using repeat/rehearse memory strategy x2 for delayed recall. Patient would benefit from continued skilled OT.     Plan: Continued skilled OT per POC    "

## 2025-06-16 ENCOUNTER — APPOINTMENT (OUTPATIENT)
Dept: PHYSICAL THERAPY | Facility: REHABILITATION | Age: 45
End: 2025-06-16
Attending: FAMILY MEDICINE
Payer: COMMERCIAL

## 2025-06-17 ENCOUNTER — APPOINTMENT (OUTPATIENT)
Dept: OCCUPATIONAL THERAPY | Facility: CLINIC | Age: 45
End: 2025-06-17
Attending: FAMILY MEDICINE
Payer: COMMERCIAL

## 2025-06-18 ENCOUNTER — OFFICE VISIT (OUTPATIENT)
Dept: PHYSICAL THERAPY | Facility: REHABILITATION | Age: 45
End: 2025-06-18
Attending: FAMILY MEDICINE
Payer: COMMERCIAL

## 2025-06-18 DIAGNOSIS — S06.0X0A CONCUSSION WITHOUT LOSS OF CONSCIOUSNESS, INITIAL ENCOUNTER: Primary | ICD-10-CM

## 2025-06-18 DIAGNOSIS — M25.562 ACUTE PAIN OF LEFT KNEE: ICD-10-CM

## 2025-06-18 PROCEDURE — 97110 THERAPEUTIC EXERCISES: CPT

## 2025-06-18 NOTE — PROGRESS NOTES
"Daily Note     Today's date: 2025  Patient name: Elissa Barraza  : 1980  MRN: [id number]  Referring provider: Ivan Perez MD  Dx:   Encounter Diagnosis     ICD-10-CM    1. Concussion without loss of consciousness, initial encounter  S06.0X0A       2. Acute pain of left knee  M25.562                                Subjective: Pt reports she had MRI f/u yesterday and received a Cortisone injection in her L knee. She reports mild improvement, still a lot of soreness today. She reports she feels she would like to get a second opinion regarding her knee. She further reports that her neck felt better following lv and did not get really painful or lock up after holding exercises.    Objective: See treatment diary below      Assessment: Pt with good tolerance to treatment. Added UT stretch back into program in attempt to gradually add exercises back without agg symptoms. Held some knee TE today due to Cortisone injection yesterday. Pt c/o some medial knee pain during QS and SLR, modified dosage appropriately. Will monitor response to adding UT stretch nv and gradually add exercises for cervical spine as able. Pt denies adverse response post treatment and would benefit from continued skilled PT to improve current deficits and maximize function.      Plan: Continue per plan of care.        Precautions: There is no problem list on file for this patient.        Daily Treatment Diary     Assessment       Visit number             Eval/Reval             FOTO     **        HEP Issued              Manuals             SOR  NT NT CM TE hold       STM/MFR     TE brief hold       Natalya taping for med. glide SE    TE                     Exercise Diary                           Cerv ret  Supine 50% 5\"x10 Supine 50% 5\"x10 Supine 50%  5\"x10 Supine 50%  5\"x10 Hold today       Cerv ext             Cerv flex             SNAGs w/left rot 3\"x10 B  L 3\"x10 L 3\" x10 L 3\"x10 D/c       Pencil " "push ups   R eye only.  D/C         Shoulder shrugs             SB siva   5\"x5 5\"x5  5\"x5 Hold today       UT  stretch R SB during L cupping    L LS  5\"x10  10\"x5 10\"x5 10\"x5 B Hold today 10\"x5 B       Median Nerve glide             SA quad siva at 80 deg      5\"x10       SLR   2x5 2x5 2x10 2x10 1x10  1x5                   SAQ np  No ball 1# 2x10 No ball  1#  2x10 No ball 1#  2x10 2#R 2x10 L x10, x9 Not today      QS w/towel       TC  5\" 2x10    QS  5\"x10      Prone press ups  1x10 1x10 1x10 1x10 1x10, w/op 1x7 1x10      Hip abd TB      5\"x10 OTB  5\"  2x10      Leg press w/ball squeeze      Ball press at wall NV       Bike warm up L1x4.5'  L1x5' L1x5' L1x5' L1x5' Not today      Modalities    6/4         MH declined W/TENS 10 min paraspinals and levator W/TENS  15' W/ TENS15' W/ tens x10' D/C                           "

## 2025-06-19 ENCOUNTER — OFFICE VISIT (OUTPATIENT)
Dept: OCCUPATIONAL THERAPY | Facility: CLINIC | Age: 45
End: 2025-06-19
Attending: FAMILY MEDICINE
Payer: COMMERCIAL

## 2025-06-19 DIAGNOSIS — R41.840 CONCENTRATION DEFICIT: Primary | ICD-10-CM

## 2025-06-19 PROCEDURE — 97530 THERAPEUTIC ACTIVITIES: CPT

## 2025-06-19 NOTE — PROGRESS NOTES
"Occupational Therapy Daily Note:    Today's date: 2025  Patient name: Elissa Barraza  : 1980  MRN: [id number]  Referring provider: Ivan Perez MD  Dx:   Encounter Diagnosis   Name Primary?    Concentration deficit Yes            POC expires Unit limit Auth Expiration date PT/OT/ST + Visit Limit?   25  No auth 60 PT/OT/ST PCY                           Visit/Unit Tracking  AUTH Status:  Date 5/23 6/3 6/5 6/12 6/19         60 visits through 2025 Used 1 2 3 4 5          Remaining  59 58 57 56 55              Duration in weeks: 8  Plan of care beginning date: 25  Plan of care expiration date: 25  PN #1 due: 25    Precautions: diplopia, headaches    Subjective: \"I got my knee injection and it didn't really help\"  Obtaining a script from PCP for Neuro Ophthalmologist.     Objective: See treatment below.     Thera Act:    Pt participated in multimodal activity focuses on vision and short term memory. Pt was shown \"[place]\" memory card for 1 minute to memorize. Pt participated in visual scanning activity worksheet crossing out three letters and then locating respected letters on tiles that were spread over the table working on visual scanning, saccades, and near point convergence.   Trial 1 - 3/5  Trial 2 - 3/5  Activity was upgraded to include holding the memory card closer to the face working on sustained convergence skills. Pt was given 2 minutes to memorize card and to help increase convergence at near point demand. Letter worksheet card was also increased to 2 minutes.   Trial 3 -   Walk break of 2 minutes was done after pt reported an increase in headache symptoms.   Trial 4 - /5  Trial 5 -     Assessment: Tolerated treatment well. Patient would benefit from continued skilled OT. Pt held the card as far out as possible when memorizing it 2/2 decreased near point convergence skills. Pt demo the need to take frequent breaks when memorizing the card at near " point 2/2 challenges maintaining focus and discomforting superior to the eyes. Pt reported challenges with scanning the letter tiles and reported most difficulty with the horizontally striped letters that overwhelmed her. Pt reported that a headache of 6-7/10 was acquired during the session.    Plan: Continued skilled OT per POC. Continue to work on near point activities.

## 2025-06-23 ENCOUNTER — OFFICE VISIT (OUTPATIENT)
Dept: PHYSICAL THERAPY | Facility: REHABILITATION | Age: 45
End: 2025-06-23
Attending: FAMILY MEDICINE
Payer: COMMERCIAL

## 2025-06-23 DIAGNOSIS — M25.562 ACUTE PAIN OF LEFT KNEE: ICD-10-CM

## 2025-06-23 DIAGNOSIS — S06.0X0A CONCUSSION WITHOUT LOSS OF CONSCIOUSNESS, INITIAL ENCOUNTER: Primary | ICD-10-CM

## 2025-06-23 PROCEDURE — 97112 NEUROMUSCULAR REEDUCATION: CPT

## 2025-06-23 PROCEDURE — 97140 MANUAL THERAPY 1/> REGIONS: CPT

## 2025-06-23 PROCEDURE — 97110 THERAPEUTIC EXERCISES: CPT

## 2025-06-23 NOTE — PROGRESS NOTES
"Daily Note     Today's date: 2025  Patient name: Elissa Barrzaa  : 1980  MRN: [id number]  Referring provider: Ivan Perez MD  Dx:   Encounter Diagnosis     ICD-10-CM    1. Concussion without loss of consciousness, initial encounter  S06.0X0A       2. Acute pain of left knee  M25.562                      Subjective: Pt reports having a sharp pain and buckling in her L knee yesterday, seems to be better today.       Objective: See treatment diary below      Assessment: Tolerated treatment well. Patient would benefit from continued PT      Plan: Continue per plan of care.        Precautions: There is no problem list on file for this patient.        Daily Treatment Diary     Assessment      Visit number             Abraham/Reval             FOTO     **        HEP Issued              Manuals             SOR  NT NT CM TE hold  HA     STM/MFR     TE brief hold  SANDERS     Pashael taping for med. glide SE    TE                     Exercise Diary                           Cerv ret  Supine 50% 5\"x10 Supine 50% 5\"x10 Supine 50%  5\"x10 Supine 50%  5\"x10 Hold today       Cerv ext             Cerv flex             SNAGs w/left rot 3\"x10 B  L 3\"x10 L 3\" x10 L 3\"x10 D/c       Pencil push ups   R eye only.  D/C         Shoulder shrugs             SB siva   5\"x5 5\"x5  5\"x5 Hold today       UT  stretch R SB during L cupping    L LS  5\"x10  10\"x5 10\"x5 10\"x5 B Hold today 10\"x5 B       Median Nerve glide             SA quad siva at 80 deg      5\"x10       SLR   2x5 2x5 2x10 2x10 1x10  1x5 2x10                  SAQ np  No ball 1# 2x10 No ball  1#  2x10 No ball 1#  2x10 2#R 2x10 L x10, x9 Not today 2# 2x10     QS w/towel       TC  5\" 2x10    QS  5\"x10 TC  5\" 2x10    QS  5\"x10     Prone press ups  1x10 1x10 1x10 1x10 1x10, w/op 1x7 1x10      Hip abd TB      5\"x10 OTB  5\"  2x10 OTB 5\" 2x10     Leg press w/ball squeeze      Ball press at wall NV       Bike warm up L1x4.5'  L1x5' L1x5' L1x5' " L1x5' Not today      Modalities    6/4         MH declined W/TENS 10 min paraspinals and levator W/TENS  15' W/ TENS15' W/ tens x10' D/C

## 2025-06-25 ENCOUNTER — OFFICE VISIT (OUTPATIENT)
Dept: PHYSICAL THERAPY | Facility: REHABILITATION | Age: 45
End: 2025-06-25
Attending: FAMILY MEDICINE
Payer: COMMERCIAL

## 2025-06-25 DIAGNOSIS — S06.0X0A CONCUSSION WITHOUT LOSS OF CONSCIOUSNESS, INITIAL ENCOUNTER: Primary | ICD-10-CM

## 2025-06-25 DIAGNOSIS — M25.562 ACUTE PAIN OF LEFT KNEE: ICD-10-CM

## 2025-06-25 PROCEDURE — 97112 NEUROMUSCULAR REEDUCATION: CPT

## 2025-06-25 PROCEDURE — 97140 MANUAL THERAPY 1/> REGIONS: CPT

## 2025-06-25 PROCEDURE — 97110 THERAPEUTIC EXERCISES: CPT

## 2025-06-25 NOTE — PROGRESS NOTES
"Daily Note     Today's date: 2025  Patient name: Elissa Barraza  : 1980  MRN: [id number]  Referring provider: Ivan Perez MD  Dx:   Encounter Diagnosis     ICD-10-CM    1. Concussion without loss of consciousness, initial encounter  S06.0X0A       2. Acute pain of left knee  M25.562                      Subjective:  Patient reports that her L knee is hurting about the same.  She notes that her pain was less intense following the injection however the pain is back as it was.   Patient reports that her neck is feeling a little better. She notes a lot of tightness in her neck when she uses her arms to do things.       Objective: See treatment diary below      Assessment: Patient tolerated treatment fair. Patient performed a modified ex program for her L knee due to pain.  Patient feels a \"full feeling in the center of her knee\".  Performed gentle STM cervical paraspinal and B U/LS.  Minimal UT/LS tightness however, + palpable tightness in high cervical/occipital area.  Patient noted mild cervical soreness post treatment.  Patient would benefit from continued PT intervention to address deficits and attain set goals.       Plan: Continue per plan of care.        Precautions: There is no problem list on file for this patient.        Daily Treatment Diary     Assessment     Visit number             Eval/Reval             FOTO     **        HEP Issued              Manuals             SOR  NT NT CM TE hold  HA CC    STM/MFR     TE brief hold  SANDERS CC    Natalya taping for med. glide SE    TE                     Exercise Diary                           Cerv ret  Supine 50% 5\"x10 Supine 50% 5\"x10 Supine 50%  5\"x10 Supine 50%  5\"x10 Hold today       Cerv ext             Cerv flex             SNAGs w/left rot 3\"x10 B  L 3\"x10 L 3\" x10 L 3\"x10 D/c       Pencil push ups   R eye only.  D/C         Shoulder shrugs             SB siva   5\"x5 5\"x5  5\"x5 Hold today       UT  " "stretch R SB during L cupping    L LS  5\"x10  10\"x5 10\"x5 10\"x5 B Hold today 10\"x5 B  10\"x5 B     Median Nerve glide             SA quad siva at 80 deg      5\"x10       SLR   2x5 2x5 2x10 2x10 1x10  1x5 2x10 1x10                 SAQ np  No ball 1# 2x10 No ball  1#  2x10 No ball 1#  2x10 2#R 2x10 L x10, x9 Not today 2# 2x10 2#  1x10      QS w/towel       TC  5\" 2x10    QS  5\"x10 TC  5\" 2x10    QS  5\"x10 TC  5\"  2x10  QS  5\"x10    Prone press ups  1x10 1x10 1x10 1x10 1x10, w/op 1x7 1x10      Hip abd TB      5\"x10 OTB  5\"  2x10 OTB 5\" 2x10 OTB  5\" 2x10      Leg press w/ball squeeze      Ball press at wall NV       Bike warm up L1x4.5'  L1x5' L1x5' L1x5' L1x5' Not today  L0 x5'    Modalities    6/4         MH declined W/TENS 10 min paraspinals and levator W/TENS  15' W/ TENS15' W/ tens x10' D/C                               "

## 2025-06-27 ENCOUNTER — APPOINTMENT (OUTPATIENT)
Dept: OCCUPATIONAL THERAPY | Facility: CLINIC | Age: 45
End: 2025-06-27
Attending: FAMILY MEDICINE
Payer: COMMERCIAL
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
## 2025-06-30 ENCOUNTER — EVALUATION (OUTPATIENT)
Dept: OCCUPATIONAL THERAPY | Facility: CLINIC | Age: 45
End: 2025-06-30
Attending: FAMILY MEDICINE
Payer: COMMERCIAL

## 2025-06-30 DIAGNOSIS — R41.840 CONCENTRATION DEFICIT: Primary | ICD-10-CM

## 2025-06-30 PROCEDURE — 97530 THERAPEUTIC ACTIVITIES: CPT
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
## 2025-07-03 ENCOUNTER — OFFICE VISIT (OUTPATIENT)
Dept: OCCUPATIONAL THERAPY | Facility: CLINIC | Age: 45
End: 2025-07-03
Attending: FAMILY MEDICINE
Payer: COMMERCIAL

## 2025-07-03 DIAGNOSIS — R41.840 CONCENTRATION DEFICIT: Primary | ICD-10-CM

## 2025-07-03 PROCEDURE — 97530 THERAPEUTIC ACTIVITIES: CPT

## 2025-07-03 NOTE — PROGRESS NOTES
"Occupational Therapy Daily Note:    Today's date: 7/3/2025  Patient name: Elissa Barraza  : 1980  MRN: [id number]  Referring provider: Ivan Perez MD  Dx:   Encounter Diagnosis   Name Primary?    Concentration deficit Yes       Start Time: 1121  Stop Time: 1200  Total time in clinic (min): 39 minutes    POC expires Unit limit Auth Expiration date PT/OT/ST + Visit Limit?   25  No auth 60 PT/OT/ST PCY                           Visit/Unit Tracking  AUTH Status:  Date 5/23 6/3 6/5 6/12 6/19 6/30        60 visits through 2025 Used 1 2 3 4 5 6         Remaining  59 58 57 56 55 54             Duration in weeks: 8  Plan of care beginning date: 25  Plan of care expiration date: 25  Re-Eval due: 25    Subjective: \"I have to keep squinting so I can see.\"    Objective: See treatment below.  Session focusing on cognitive skills in mutli modal environment improving safety and indep in ADL/IADL's.    Thera Act:  Education provided to pt regarding recall of conversations recommending pt write down details or specifics of conversation and review at a later time for recall of conversation.     Activity focusing on immediate/delayed recall, visual/mental processing and attention w/visual demands of scanning environment to identify words.  Auditory processing with immediate recall of 4 word sets mentally organizing words into sentence formation followed by retrieving words from clutter cards positioned tabletop increasing visual distraction.  Pt successfully completed 5 trials grading activity up to 3 min timed delay while engaging in \"spot the difference\" identifying 6 differences b/t 2 cards. Pt successfully recalled 2 word sets w/3 min delay.     Assessment: Tolerated treatment well. Pt arrived to OT session w/o HA increasing to 5/10 during memory recall task. Pt noted w/increased \"squinting\" with time at task due to bright colors and increased visual demands. Patient would benefit from continued " skilled OT.    Plan:

## 2025-07-14 ENCOUNTER — OFFICE VISIT (OUTPATIENT)
Dept: OBGYN CLINIC | Facility: MEDICAL CENTER | Age: 45
End: 2025-07-14
Payer: COMMERCIAL

## 2025-07-14 VITALS — WEIGHT: 215 LBS | HEIGHT: 62 IN | BODY MASS INDEX: 39.56 KG/M2

## 2025-07-14 DIAGNOSIS — M54.16 RADICULOPATHY, LUMBAR REGION: Primary | ICD-10-CM

## 2025-07-14 PROCEDURE — 99204 OFFICE O/P NEW MOD 45 MIN: CPT | Performed by: ORTHOPAEDIC SURGERY

## 2025-07-14 RX ORDER — MELOXICAM 15 MG/1
15 TABLET ORAL DAILY
Qty: 30 TABLET | Refills: 1 | Status: SHIPPED | OUTPATIENT
Start: 2025-07-14
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
## 2025-07-15 ENCOUNTER — TELEPHONE (OUTPATIENT)
Dept: OCCUPATIONAL THERAPY | Facility: CLINIC | Age: 45
End: 2025-07-15

## 2025-07-16 ENCOUNTER — OFFICE VISIT (OUTPATIENT)
Dept: PHYSICAL THERAPY | Facility: REHABILITATION | Age: 45
End: 2025-07-16
Attending: FAMILY MEDICINE
Payer: COMMERCIAL

## 2025-07-16 DIAGNOSIS — M25.562 ACUTE PAIN OF LEFT KNEE: ICD-10-CM

## 2025-07-16 DIAGNOSIS — S06.0X0A CONCUSSION WITHOUT LOSS OF CONSCIOUSNESS, INITIAL ENCOUNTER: Primary | ICD-10-CM

## 2025-07-16 PROCEDURE — 97140 MANUAL THERAPY 1/> REGIONS: CPT | Performed by: PHYSICAL THERAPIST

## 2025-07-16 PROCEDURE — 97110 THERAPEUTIC EXERCISES: CPT | Performed by: PHYSICAL THERAPIST

## 2025-07-16 PROCEDURE — 97164 PT RE-EVAL EST PLAN CARE: CPT | Performed by: PHYSICAL THERAPIST
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
## 2025-07-18 ENCOUNTER — TELEPHONE (OUTPATIENT)
Dept: PHYSICAL THERAPY | Facility: REHABILITATION | Age: 45
End: 2025-07-18

## 2025-07-22 ENCOUNTER — TELEPHONE (OUTPATIENT)
Dept: OCCUPATIONAL THERAPY | Facility: CLINIC | Age: 45
End: 2025-07-22

## 2025-07-22 ENCOUNTER — CONSULT (OUTPATIENT)
Dept: PAIN MEDICINE | Facility: CLINIC | Age: 45
End: 2025-07-22
Payer: COMMERCIAL

## 2025-07-22 VITALS — HEIGHT: 62 IN | WEIGHT: 213 LBS | BODY MASS INDEX: 39.2 KG/M2

## 2025-07-22 DIAGNOSIS — M54.12 CERVICAL RADICULOPATHY: Primary | ICD-10-CM

## 2025-07-22 DIAGNOSIS — M54.16 RADICULOPATHY, LUMBAR REGION: ICD-10-CM

## 2025-07-22 PROCEDURE — 99204 OFFICE O/P NEW MOD 45 MIN: CPT | Performed by: ANESTHESIOLOGY
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
## 2025-07-22 NOTE — TELEPHONE ENCOUNTER
Pt N/S. LM message we will be removing all appts and D/C per phone call from Lola last week due to n/s. Pt will need a script to return to OT.

## 2025-07-24 ENCOUNTER — APPOINTMENT (OUTPATIENT)
Dept: OCCUPATIONAL THERAPY | Facility: CLINIC | Age: 45
End: 2025-07-24
Attending: FAMILY MEDICINE
Payer: COMMERCIAL

## 2025-07-28 ENCOUNTER — APPOINTMENT (OUTPATIENT)
Dept: PHYSICAL THERAPY | Facility: REHABILITATION | Age: 45
End: 2025-07-28
Attending: FAMILY MEDICINE
Payer: COMMERCIAL

## 2025-07-29 ENCOUNTER — APPOINTMENT (OUTPATIENT)
Dept: OCCUPATIONAL THERAPY | Facility: CLINIC | Age: 45
End: 2025-07-29
Attending: FAMILY MEDICINE
Payer: COMMERCIAL

## 2025-07-31 ENCOUNTER — APPOINTMENT (OUTPATIENT)
Dept: OCCUPATIONAL THERAPY | Facility: CLINIC | Age: 45
End: 2025-07-31
Attending: FAMILY MEDICINE
Payer: COMMERCIAL

## 2025-07-31 ENCOUNTER — APPOINTMENT (OUTPATIENT)
Dept: PHYSICAL THERAPY | Facility: REHABILITATION | Age: 45
End: 2025-07-31
Attending: FAMILY MEDICINE
Payer: COMMERCIAL

## 2025-08-01 ENCOUNTER — TELEPHONE (OUTPATIENT)
Dept: NEUROLOGY | Facility: CLINIC | Age: 45
End: 2025-08-01

## 2025-08-01 ENCOUNTER — TELEPHONE (OUTPATIENT)
Dept: PAIN MEDICINE | Facility: CLINIC | Age: 45
End: 2025-08-01

## 2025-08-04 DIAGNOSIS — M54.12 CERVICAL RADICULITIS: Primary | ICD-10-CM

## 2025-08-04 DIAGNOSIS — M54.16 LUMBAR RADICULOPATHY: ICD-10-CM

## 2025-08-05 ENCOUNTER — OFFICE VISIT (OUTPATIENT)
Dept: NEUROLOGY | Facility: CLINIC | Age: 45
End: 2025-08-05
Payer: COMMERCIAL

## 2025-08-05 VITALS
OXYGEN SATURATION: 97 % | HEIGHT: 62 IN | DIASTOLIC BLOOD PRESSURE: 70 MMHG | BODY MASS INDEX: 40.59 KG/M2 | HEART RATE: 75 BPM | TEMPERATURE: 97.9 F | SYSTOLIC BLOOD PRESSURE: 128 MMHG | WEIGHT: 220.6 LBS

## 2025-08-05 DIAGNOSIS — E66.01 MORBID OBESITY (HCC): ICD-10-CM

## 2025-08-05 DIAGNOSIS — G44.329 CHRONIC POST-TRAUMATIC HEADACHE, NOT INTRACTABLE: ICD-10-CM

## 2025-08-05 DIAGNOSIS — M79.18 CERVICAL MYOFASCIAL PAIN SYNDROME: ICD-10-CM

## 2025-08-05 DIAGNOSIS — Z72.0 TOBACCO USE: ICD-10-CM

## 2025-08-05 DIAGNOSIS — Z87.820 HISTORY OF CONCUSSION: ICD-10-CM

## 2025-08-05 DIAGNOSIS — G43.709 CHRONIC MIGRAINE WITHOUT AURA WITHOUT STATUS MIGRAINOSUS, NOT INTRACTABLE: Primary | ICD-10-CM

## 2025-08-05 DIAGNOSIS — R41.3 MEMORY PROBLEM: ICD-10-CM

## 2025-08-05 DIAGNOSIS — H53.8 BLURRY VISION: ICD-10-CM

## 2025-08-05 PROCEDURE — 99204 OFFICE O/P NEW MOD 45 MIN: CPT | Performed by: STUDENT IN AN ORGANIZED HEALTH CARE EDUCATION/TRAINING PROGRAM

## 2025-08-05 RX ORDER — RIZATRIPTAN BENZOATE 10 MG/1
10 TABLET ORAL AS NEEDED
Qty: 10 TABLET | Refills: 6 | Status: SHIPPED | OUTPATIENT
Start: 2025-08-05

## 2025-08-05 RX ORDER — LANOLIN ALCOHOL/MO/W.PET/CERES
400 CREAM (GRAM) TOPICAL
Qty: 90 TABLET | Refills: 3 | Status: SHIPPED | OUTPATIENT
Start: 2025-08-05

## 2025-08-05 RX ORDER — TOPIRAMATE 25 MG/1
TABLET, FILM COATED ORAL
Qty: 120 TABLET | Refills: 4 | Status: SHIPPED | OUTPATIENT
Start: 2025-08-05

## 2025-08-11 ENCOUNTER — APPOINTMENT (OUTPATIENT)
Dept: LAB | Age: 45
End: 2025-08-11
Payer: COMMERCIAL

## 2025-08-15 ENCOUNTER — HOSPITAL ENCOUNTER (OUTPATIENT)
Dept: MRI IMAGING | Facility: HOSPITAL | Age: 45
End: 2025-08-15
Attending: STUDENT IN AN ORGANIZED HEALTH CARE EDUCATION/TRAINING PROGRAM
Payer: COMMERCIAL